# Patient Record
Sex: MALE | Race: WHITE | NOT HISPANIC OR LATINO | Employment: UNEMPLOYED | URBAN - METROPOLITAN AREA
[De-identification: names, ages, dates, MRNs, and addresses within clinical notes are randomized per-mention and may not be internally consistent; named-entity substitution may affect disease eponyms.]

---

## 2021-07-16 ENCOUNTER — HOSPITAL ENCOUNTER (EMERGENCY)
Facility: HOSPITAL | Age: 51
Discharge: PSYCHIATRIC OR SUBSTANCE USE FACILITY | End: 2021-07-16
Attending: EMERGENCY MEDICINE | Admitting: EMERGENCY MEDICINE

## 2021-07-16 VITALS
HEIGHT: 73 IN | WEIGHT: 198.85 LBS | OXYGEN SATURATION: 96 % | SYSTOLIC BLOOD PRESSURE: 95 MMHG | HEART RATE: 85 BPM | DIASTOLIC BLOOD PRESSURE: 80 MMHG | RESPIRATION RATE: 19 BRPM | TEMPERATURE: 98.1 F | BODY MASS INDEX: 26.36 KG/M2

## 2021-07-16 DIAGNOSIS — F32.A CHRONIC DEPRESSION: Primary | ICD-10-CM

## 2021-07-16 LAB
AMPHET+METHAMPHET UR QL: NEGATIVE
BARBITURATES UR QL SCN: NEGATIVE
BENZODIAZ UR QL SCN: NEGATIVE
CANNABINOIDS SERPL QL: NEGATIVE
COCAINE UR QL: NEGATIVE
ETHANOL BLD-MCNC: <10 MG/DL (ref 0–10)
ETHANOL UR QL: <0.01 %
HOLD SPECIMEN: NORMAL
HOLD SPECIMEN: NORMAL
METHADONE UR QL SCN: NEGATIVE
OPIATES UR QL: NEGATIVE
OXYCODONE UR QL SCN: NEGATIVE
WHOLE BLOOD HOLD SPECIMEN: NORMAL

## 2021-07-16 PROCEDURE — 80307 DRUG TEST PRSMV CHEM ANLYZR: CPT

## 2021-07-16 PROCEDURE — 36415 COLL VENOUS BLD VENIPUNCTURE: CPT

## 2021-07-16 PROCEDURE — 82077 ASSAY SPEC XCP UR&BREATH IA: CPT

## 2021-07-16 PROCEDURE — 99283 EMERGENCY DEPT VISIT LOW MDM: CPT

## 2021-07-16 NOTE — SIGNIFICANT NOTE
"Pt presented to the ED from the community. Pt stated that he was \"talking to a lady at a restaurant\" and she recommended that he come here in order for him to get help with resources. Pt stated that he is from [place] and made his way to [place], because he thought \"it would be better here.\" Pt reported some mild depression related to being homeless and stated that he is also off his psychiatric medications. Pt states that he has been off of his medications for three months and cannot recall what he was taking. Today, Pt is not in any acute crisis. Pt denied any SI, intents, or plans. Pt denied any HI, intents, or plans. Pt denied any delusions and reported very vague hallucinations. Pt stated that he is seeing \"flashes.\" Pt denied any command or auditory hallucinations.   Pt is interested in transferring to a homeless shelter. The closest shelter option would be [place], [place]. Pt will be provided with cab voucher to [place], [place].   Pt is recommended to follow up with an outpatient provider and appointment with be arranged with [facility] in [place], [place].        07/16/21 1313   Behavior WDL   Behavior WDL WDL   Interactions appropriate to situation   Motor Movement lethargic   Emotion Mood WDL   Emotion/Mood depressed  (Pt reports some depression related to his current situation.)   Patient rated depression level 5   Perceptual State WDL   Hallucinations   (PT stated that he sees \"flashes.\" PT denied any command or auditory hallucinations.)   Thought Process WDL   Thought Process WDL all   Delusions no delusions  (Pt is AOX4. No delusions present.)   Judgment and Insight denies responsibility;insight not appropriate to situation  (Pt provides very minimal insight into his current situation. Pt states that he is homeless and does not know why he came to [place] or to the hospital today.)   Coping/Stress   Sources of Support other (see comments)  (Pt reported that he has a very poor support system.)   Techniques to [place] " "with Loss/Stress/Change   (Pt could benefit from outpatient treatment.)   Coping/Stress Comments \"[facility] [place] about [facility] months ago. I haven't had my medications.\"   C-SSRS (Recent)   Q1 Wished to be Dead (Past Month) no   Q2 Suicidal Thoughts (Past Month) no   Q6 Suicide Behavior (Lifetime) no   Level of Risk per Screen screen negative   Violence Risk   Feels Like Hurting Others no   Previous Attempt to Harm Others no   Violence Threats in Past 6 Months Pt stated that if people bother him he feels like he wants to hurt them. Pt denied any HI and denied any specific victims that he wants to harm.     "

## 2021-07-16 NOTE — ED PROVIDER NOTES
"Subjective   Pt states he was in [place] at a psych/homeless facility. He left there and walked/hitch hiked to this area and has not had his meds in over a week. He says he was talking with someone who helps those who are homeless get their medical cards, etc and told her about his increasing irritability and agitation due to having been off his meds.  He says she told him he needed to come here, so she called the police who brought him to the. He denies SI or HI, but says he feels \"edgy\"      History provided by:  Patient   used: No    Psychiatric Evaluation  Severity:  Mild  Onset quality:  Unable to specify  Progression:  Worsening  Chronicity:  Chronic      Review of Systems   Constitutional: Negative.    HENT: Negative.    Eyes: Negative.    Respiratory: Negative.    Cardiovascular: Negative.    Gastrointestinal: Negative.    Endocrine: Negative.    Genitourinary: Negative.    Musculoskeletal: Negative.    Skin: Negative.    Allergic/Immunologic: Negative.    Neurological: Negative.    Hematological: Negative.    Psychiatric/Behavioral: Negative.        Past Medical History:   Diagnosis Date   • Hypertension    • Schizoaffective disorder (CMS/HCC)        No Known Allergies    History reviewed. No pertinent surgical history.    History reviewed. No pertinent family history.    Social History     Socioeconomic History   • Marital status:      Spouse name: Not on file   • Number of children: Not on file   • Years of education: Not on file   • Highest education level: Not on file   Tobacco Use   • Smoking status: Current Every Day Smoker     Packs/day: 1.50     Years: 30.00     Pack years: 45.00   Vaping Use   • Vaping Use: Some days   Substance and Sexual Activity   • Alcohol use: Never   • Sexual activity: Not Currently           Objective   Physical Exam  Constitutional:       Appearance: Normal appearance.   HENT:      Head: Normocephalic and atraumatic.      Nose: Nose normal. "      Mouth/Throat:      Mouth: Mucous membranes are moist.   Eyes:      Pupils: Pupils are equal, round, and reactive to light.   Cardiovascular:      Rate and Rhythm: Normal rate and regular rhythm.      Pulses: Normal pulses.   Pulmonary:      Effort: Pulmonary effort is normal.      Breath sounds: Normal breath sounds.   Abdominal:      General: Abdomen is flat. Bowel sounds are normal.      Palpations: Abdomen is soft.   Musculoskeletal:         General: Normal range of motion.      Cervical back: Normal range of motion.   Skin:     General: Skin is warm and dry.      Capillary Refill: Capillary refill takes less than 2 seconds.   Neurological:      General: No focal deficit present.      Mental Status: He is alert and oriented to person, place, and time. Mental status is at baseline.   Psychiatric:         Mood and Affect: Mood is anxious.         Behavior: Behavior is agitated.         Thought Content: Thought content does not include homicidal or suicidal ideation.         Judgment: Judgment is impulsive.         Procedures           ED Course  ED Course as of Jul 16 1427 Fri Jul 16, 2021   1314 ED  (Margaret) in to assess pt    [TP]      ED Course User Index  [TP] Chloe Rey APRN                                           MDM  Number of Diagnoses or Management Options  Chronic depression: minor  Diagnosis management comments: I have spoken with the pt and I have explained his condition, diagnoses and treatment plan based on the information available to me at this time. I have answered his questions and addressed any concerns. The patient has a good understanding of his diagnosis, condition, and treatment plan as can be expected at this point. The vital signs have been stable. The patient´s condition is stable and appropriate for discharge from the emergency department. The ED  has arranged for the patient to go to a homeless shelter, and has set up follow up for him at  [facility] in [place].     The patient will pursue further outpatient evaluation with the primary care physician or other designated or consulting physician as outlined in the discharge instructions. They are agreeable to this plan of care and follow-up instructions have been explained in detail. The pt has received these instructions in written format and have expressed an understanding of the discharge instructions. The patient is aware that any significant change in condition or worsening of symptoms should prompt an immediate return to this or the closest emergency department or call to 911.       Amount and/or Complexity of Data Reviewed  Clinical lab tests: reviewed and ordered    Risk of Complications, Morbidity, and/or Mortality  Presenting problems: low  Diagnostic procedures: low  Management options: low    Patient Progress  Patient progress: stable      Final diagnoses:   Chronic depression       ED Disposition  ED Disposition     ED Disposition Condition Comment    Discharge Stable           [facility]  [address]  [facility] [address]  [phone]  Follow up on 8/10/2021  Please follow up with [facility] at the appointment listed above.            Medication List      No changes were made to your prescriptions during this visit.          Chloe Rey, APRN  07/16/21 1427

## 2024-04-02 ENCOUNTER — HOSPITAL ENCOUNTER (INPATIENT)
Facility: HOSPITAL | Age: 54
LOS: 9 days | Discharge: HOME | End: 2024-04-11
Attending: PSYCHIATRY & NEUROLOGY | Admitting: PSYCHIATRY & NEUROLOGY
Payer: MEDICARE

## 2024-04-02 PROBLEM — R45.851 SUICIDAL IDEATIONS: Status: ACTIVE | Noted: 2024-04-02

## 2024-04-02 PROBLEM — F25.0 SCHIZOAFFECTIVE DISORDER, BIPOLAR TYPE: Status: ACTIVE | Noted: 2024-04-02

## 2024-04-02 LAB
ALBUMIN SERPL-MCNC: 3.8 G/DL (ref 3.5–5.2)
ALBUMIN/GLOB SERPL: 1.5 G/DL
ALP SERPL-CCNC: 65 U/L (ref 39–117)
ALT SERPL W P-5'-P-CCNC: 21 U/L (ref 1–41)
ANION GAP SERPL CALCULATED.3IONS-SCNC: 7.8 MMOL/L (ref 5–15)
AST SERPL-CCNC: 21 U/L (ref 1–40)
BASOPHILS # BLD AUTO: 0.03 10*3/MM3 (ref 0–0.2)
BASOPHILS NFR BLD AUTO: 0.5 % (ref 0–1.5)
BILIRUB SERPL-MCNC: 0.2 MG/DL (ref 0–1.2)
BUN SERPL-MCNC: 13 MG/DL (ref 6–20)
BUN/CREAT SERPL: 16.7 (ref 7–25)
CALCIUM SPEC-SCNC: 8.9 MG/DL (ref 8.6–10.5)
CHLORIDE SERPL-SCNC: 108 MMOL/L (ref 98–107)
CO2 SERPL-SCNC: 26.2 MMOL/L (ref 22–29)
CREAT SERPL-MCNC: 0.78 MG/DL (ref 0.76–1.27)
DEPRECATED RDW RBC AUTO: 44.5 FL (ref 37–54)
EGFRCR SERPLBLD CKD-EPI 2021: 105.3 ML/MIN/1.73
EOSINOPHIL # BLD AUTO: 0.17 10*3/MM3 (ref 0–0.4)
EOSINOPHIL NFR BLD AUTO: 2.9 % (ref 0.3–6.2)
ERYTHROCYTE [DISTWIDTH] IN BLOOD BY AUTOMATED COUNT: 12.7 % (ref 12.3–15.4)
GLOBULIN UR ELPH-MCNC: 2.6 GM/DL
GLUCOSE SERPL-MCNC: 94 MG/DL (ref 65–99)
HCT VFR BLD AUTO: 41.3 % (ref 37.5–51)
HGB BLD-MCNC: 14.1 G/DL (ref 13–17.7)
IMM GRANULOCYTES # BLD AUTO: 0.02 10*3/MM3 (ref 0–0.05)
IMM GRANULOCYTES NFR BLD AUTO: 0.3 % (ref 0–0.5)
LYMPHOCYTES # BLD AUTO: 2.17 10*3/MM3 (ref 0.7–3.1)
LYMPHOCYTES NFR BLD AUTO: 36.7 % (ref 19.6–45.3)
MCH RBC QN AUTO: 32.9 PG (ref 26.6–33)
MCHC RBC AUTO-ENTMCNC: 34.1 G/DL (ref 31.5–35.7)
MCV RBC AUTO: 96.5 FL (ref 79–97)
MONOCYTES # BLD AUTO: 0.77 10*3/MM3 (ref 0.1–0.9)
MONOCYTES NFR BLD AUTO: 13 % (ref 5–12)
NEUTROPHILS NFR BLD AUTO: 2.75 10*3/MM3 (ref 1.7–7)
NEUTROPHILS NFR BLD AUTO: 46.6 % (ref 42.7–76)
NRBC BLD AUTO-RTO: 0 /100 WBC (ref 0–0.2)
PLATELET # BLD AUTO: 186 10*3/MM3 (ref 140–450)
PMV BLD AUTO: 9.2 FL (ref 6–12)
POTASSIUM SERPL-SCNC: 4.1 MMOL/L (ref 3.5–5.2)
PROT SERPL-MCNC: 6.4 G/DL (ref 6–8.5)
RBC # BLD AUTO: 4.28 10*6/MM3 (ref 4.14–5.8)
SODIUM SERPL-SCNC: 142 MMOL/L (ref 136–145)
TSH SERPL DL<=0.05 MIU/L-ACNC: 0.6 UIU/ML (ref 0.27–4.2)
WBC NRBC COR # BLD AUTO: 5.91 10*3/MM3 (ref 3.4–10.8)

## 2024-04-02 PROCEDURE — 80053 COMPREHEN METABOLIC PANEL: CPT | Performed by: PSYCHIATRY & NEUROLOGY

## 2024-04-02 PROCEDURE — 84443 ASSAY THYROID STIM HORMONE: CPT | Performed by: PSYCHIATRY & NEUROLOGY

## 2024-04-02 PROCEDURE — 85025 COMPLETE CBC W/AUTO DIFF WBC: CPT | Performed by: PSYCHIATRY & NEUROLOGY

## 2024-04-02 PROCEDURE — 93005 ELECTROCARDIOGRAM TRACING: CPT | Performed by: PSYCHIATRY & NEUROLOGY

## 2024-04-02 RX ORDER — TRAZODONE HYDROCHLORIDE 50 MG/1
50 TABLET ORAL NIGHTLY PRN
Status: DISCONTINUED | OUTPATIENT
Start: 2024-04-02 | End: 2024-04-11 | Stop reason: HOSPADM

## 2024-04-02 RX ORDER — HYDROXYZINE HYDROCHLORIDE 25 MG/1
50 TABLET, FILM COATED ORAL EVERY 6 HOURS PRN
Status: DISCONTINUED | OUTPATIENT
Start: 2024-04-02 | End: 2024-04-11 | Stop reason: HOSPADM

## 2024-04-02 RX ORDER — ACETAMINOPHEN 325 MG/1
650 TABLET ORAL EVERY 4 HOURS PRN
Status: DISCONTINUED | OUTPATIENT
Start: 2024-04-02 | End: 2024-04-11 | Stop reason: HOSPADM

## 2024-04-02 RX ORDER — NICOTINE 21 MG/24HR
1 PATCH, TRANSDERMAL 24 HOURS TRANSDERMAL EVERY 24 HOURS
Status: DISCONTINUED | OUTPATIENT
Start: 2024-04-02 | End: 2024-04-04

## 2024-04-02 RX ORDER — NICOTINE 21 MG/24HR
1 PATCH, TRANSDERMAL 24 HOURS TRANSDERMAL EVERY 24 HOURS
Status: DISCONTINUED | OUTPATIENT
Start: 2024-04-02 | End: 2024-04-02

## 2024-04-02 RX ORDER — ARIPIPRAZOLE 5 MG/1
5 TABLET ORAL DAILY
Status: DISCONTINUED | OUTPATIENT
Start: 2024-04-02 | End: 2024-04-04

## 2024-04-02 RX ORDER — ALUMINA, MAGNESIA, AND SIMETHICONE 2400; 2400; 240 MG/30ML; MG/30ML; MG/30ML
15 SUSPENSION ORAL EVERY 6 HOURS PRN
Status: DISCONTINUED | OUTPATIENT
Start: 2024-04-02 | End: 2024-04-11 | Stop reason: HOSPADM

## 2024-04-02 RX ORDER — LOPERAMIDE HYDROCHLORIDE 2 MG/1
2 CAPSULE ORAL
Status: DISCONTINUED | OUTPATIENT
Start: 2024-04-02 | End: 2024-04-11 | Stop reason: HOSPADM

## 2024-04-02 RX ADMIN — TRAZODONE HYDROCHLORIDE 50 MG: 50 TABLET ORAL at 20:08

## 2024-04-02 RX ADMIN — ALUMINUM HYDROXIDE, MAGNESIUM HYDROXIDE, AND DIMETHICONE 15 ML: 400; 400; 40 SUSPENSION ORAL at 20:09

## 2024-04-02 RX ADMIN — ARIPIPRAZOLE 5 MG: 5 TABLET ORAL at 14:59

## 2024-04-02 RX ADMIN — ACETAMINOPHEN 650 MG: 325 TABLET, FILM COATED ORAL at 20:08

## 2024-04-02 RX ADMIN — HYDROXYZINE HYDROCHLORIDE 50 MG: 25 TABLET ORAL at 20:08

## 2024-04-02 RX ADMIN — HYDROXYZINE HYDROCHLORIDE 50 MG: 25 TABLET ORAL at 02:14

## 2024-04-02 RX ADMIN — LOPERAMIDE HYDROCHLORIDE 2 MG: 2 CAPSULE ORAL at 02:14

## 2024-04-02 RX ADMIN — TRAZODONE HYDROCHLORIDE 50 MG: 50 TABLET ORAL at 02:14

## 2024-04-02 NOTE — SIGNIFICANT NOTE
04/02/24 1646   Group Therapy Session   Group Attendance attended group session   Time Session Began 1400   Time Session Ended 1500   Total Time (minutes) 60   Group Type psychotherapeutic   Group Topic Covered balanced lifestyle;coping skills/lifestyle management;other (see comments)  (triggers)   Literature/Videos Given topic handouts   Group Session Detail Identify and increase awareness of personal triggers and their roots. Develop and implement effective coping skills to address trigger.   Patient Participation/Contribution refused to participate   Patient Participation Detail patient slept through group

## 2024-04-02 NOTE — H&P
INITIAL PSYCHIATRIC HISTORY & PHYSICAL    Patient Identification:  Name:  Juani Johnson  Age:  53 y.o.  Sex:  male  :  1970  MRN:  [id number]   Visit Number:  [id number]  Primary Care Physician:  Provider, No Known    This provider is located at her home address in [place]. on file with [facility]. This visit is being done on behalf of [facility] using a secure platform for a video visit in a secure and private environment. The Patient is located at [facility]-on a locked Behavioral Health unit. The patient's condition being diagnosed/treated is appropriate for telemedicine. The provider identified herself as well as her credentials. The patient, and/or patient's guardian, consent to being seen remotely, and when consent is given they understand that the consent allows for patient identifiable information to be sent to a third party as needed. They may refuse to be seen remotely at any time. The electronic data is encrypted and password protected, and the patient and/or guardian has been advised of the potential risks to privacy not withstanding such measures.        CC/Focus of Exam: Auditory hallucinations with plan to jump off a bridge    HPI: Juani Johnson is a 53 y.o. male who was admitted on 2024 with complaints of sedations and suicidal ideation with a plan to jump off a bridge.  Patient was a direct admit from [facility], where he self presented with the above complaints.     Patient has a long psychiatric history per his report.  He has never been to our inpatient facility however.  So information is limited, despite the history.  Patient is a poor historian.    He tells me that he has a previous diagnosis of bipolar and schizophrenia.  He has had previous hospitalizations including 2 admissions to [facility].  He is unable to tell me details, but thinks perhaps it was 2 months ago..    He reports  to me that he only takes medications when he is hospitalized, and that he struggles with suicidal ideations auditory and visual hallucinations.  He has a past history of suicide attempts last Halloween 2023 where he tried to choke himself.    Juani is homeless living on the streets.  At times he is quite disorganized and quite difficult to understand.      Past medications he reports he is taken Abilify as well as an antidepressant medication as well as a stomach pill.    He talks about the difficulties in getting his disability money, as he needs an identification which he does not currently have.    He endorses feelings of depression, suicidal ideation with plan to jump off a bridge, he reports having auditory hallucinations, as well as visual hallucinations which she describes as a blur color.  The symptoms have worsened since he has been off his psychiatric medications.  Presumably since being discharged from [facility], which thinks was about 2 months ago.      PAST PSYCHIATRIC HX:   Patient reports previous inpatient hospitalizations at least 4 or 5 times most recently [facility] 2 months ago.  He reports a diagnosis of schizophrenia and bipolar.  He is currently suicidal with a plan to jump off of a bridge.  Most recent suicide attempt and the only 1 that he discloses to me today, was by choking himself following up last year 2023.    Previous psych medications include Abilify as well as an unknown antidepressant.        SUBSTANCE USE HX:  He denies a history of illegal drug use and alcohol use.      SOCIAL HX:  He reports growing up he lived in [place] as well as [place].  He apparently has been in [place] for only a few months.  He reports he has 3 children who he does not have contact with he is  about 6 years, although details are difficult to ascertain.  It sounds as though there was a significant other relationship 20+ years ago.  Education he completed  "the 12th grade he has worked in many labor jobs throughout the years.    Past Medical History:   Diagnosis Date    Hypertension     Schizoaffective disorder         History reviewed. No pertinent surgical history.    History reviewed. No pertinent family history.      No medications prior to admission.         ALLERGIES:  Patient has no known allergies.    Temp:  [98.1 °F (36.7 °C)] 98.1 °F (36.7 °C)  Heart Rate:  [74-78] 74  Resp:  [16-18] 16  BP: (132-137)/(84-87) 137/87    REVIEW OF SYSTEMS:    General ROS: negative for - fever or malaise  Respiratory ROS: no cough, shortness of breath, or wheezing  Cardiovascular ROS: no chest pain   Gastrointestinal ROS: no abdominal pain, nausea, vomiting or diarrhea  Neuro: negative for seizures  Musculoskeletal: No difficulty moving extremities, or numbness or tingling.  Skin: negative for rash  He does report a history of \"stomach problems\" but is not able to articulate this further, he does report a history of numbness in his hands and feet as well as pain, he also describes history of injury to his upper back and neck, he mentions cracked vertebrae , from work history .  This discomfort he states is exacerbated by homelessness, he describes it as feeling pressure on his shoulders . he has a history of high blood pressure but states he is not currently medicated for this.  Previous diagnosis of hepatitis C as well    PHYSICAL EXAM:.  Physical Exam    This physical exam has been performed remotely in the unit aided of audio/visual communication tools. Nursing staff present and assisted as needed to complete.     Physical Exam:  General: Patient appears awake, alert, and in no acute distress.  Head: Normocephalic, atraumatic  Eyes: EOMI. Conjunctivae and sclerae normal.  Ears: Ears appear intact with no abnormalities noted.   Neck: Trachea midline. No obvious JVD or Thyroid enlargement.   Heart: Vital signs and EKG reviewed   Lungs: Respirations appear to be regular, even " "and unlabored with no signs of respiratory distress. No audible wheezing.  Abdomen: No obvious abdominal distension.  MS: Muscle tone appears normal. No gross deformities.  Extremities: No cyanosis or edema noted.  Skin: No visible bleeding, bruising, or rash.  Neurologic:  AAOx4. No involuntary movements observed. Coordination grossly intact.  Gait stable and steady. Moves all extremities without difficulty. Able to follow complex commands.   CN I:    Sense of smell grossly intact  CN II:               Pupils are equal and round. No gross deficits in visual acuity.  CN III IV VI:     Extraocular movements are grossly intact.  CN V:              Facial sensation and strength of muscles of mastication grossly intact.  CN VII:            Facial movements are grossly symmetric. No overt weakness.   CN VIII:           Hearing is grossly intact  CN IX and X:  Grossly intact  CN XI:             SCM and trapezius grossly normal  CN XII:            Grossly intact, tongue midline      MENTAL STATUS EXAM:    Appearance: Casually dressed, disheveled  Behavior: Generally cooperative, fair eye contact  Psychomotor: No psychomotor agitation noted, No EPS, No motor tics noted  Speech: Rambling rhythm and regular tone  Mood: \"Depressed\"   Affect: congruent and appropriate to topic at hand  Thought Content: Rambling tangential at times  Thought process: Disorganized at times  Suicidality: Endorses suicidal ideations currently  Homicidality: No HI  Perception: Reports both AH/  Insight: limited  Judgment: limited      Imaging Results (Last 24 Hours)       ** No results found for the last 24 hours. **             ECG/EMG Results (most recent)       None             Lab Results   Component Value Date    GLUCOSE 94 04/02/2024    BUN 13 04/02/2024    CREATININE 0.78 04/02/2024    EGFRIFNONA 106 01/16/2023    EGFRIFAFRI 123 01/16/2023    BCR 16.7 04/02/2024    CO2 26.2 04/02/2024    CALCIUM 8.9 04/02/2024    ALBUMIN 3.8 04/02/2024    " AST 21 04/02/2024    ALT 21 04/02/2024       Lab Results   Component Value Date    WBC 5.91 04/02/2024    HGB 14.1 04/02/2024    HCT 41.3 04/02/2024    MCV 96.5 04/02/2024     04/02/2024       Last Urine Toxicity  More data exists         Latest Ref Rng & Units 3/4/2023 3/2/2023   LAST URINE TOXICITY RESULTS   Barbiturates Screen, Urine <200 ng/ml Negative     None Detected       Benzodiazepine Screen, Urine <200 ng/ml Negative     None Detected       Cocaine Screen, Urine <300 ng/ml Negative     None Detected          Details          This result is from an external source.               Brief Urine Lab Results       None                ASSESSMENT & PLAN:        Suicidal ideations    Schizoaffective disorder, bipolar type      -Admit to [facility] for safety and stabilization   -Acclimate to unit and daily schedule   -Patient to attend group therapies as well as participate in individual therapy sessions throughout time on the unit.  -Continue precautions and safety checks  -Order comfort PRN medications.  -Education on risks of smoking tobacco products to be complete during stay, and replacement options made available.  -Physical examinations to be complete and admission labs being resulted and to be reviewed  uds is neg.  -A treatment plan will be developed and agreed upon by the patient and treatment team.  -Medication reconciliation to be completed by Pharmacist, Nurse and Psychiatrist. MISAEL to be reviewed if indicated and risk versus benefits as well as alternatives to medication regimens discussed with the patient. Will monitor for side effects during inpatient stay.   -Medications: Patient would like to be started back up on the Abilify.  Will start this for both mood and psychosis.  Will monitor patient's blood pressure, and address if needed.    -Treatment team to discuss case regularly at start discharge planning early on to aid in safe transition to a lower level of care when most  appropriate for patient.   -Estimated length of stay at this inpatient level of care is 5 to 7 days.       Psychiatrist:  Chelita Mcgowan MD  04/02/24  14:45 EDT

## 2024-04-02 NOTE — SIGNIFICANT NOTE
04/02/24 1214   Group Therapy Session   Group Attendance refused to attend group session   Time Session Began 1130   Time Session Ended 1200   Total Time (minutes) 30   Group Type recreation   Group Topic Covered other (see comments)  (Leisure education)   Group Session Detail Patient participated in group promoting identification of potential leisure activities to explore

## 2024-04-02 NOTE — PLAN OF CARE
Problem: Adult Behavioral Health Plan of Care  Goal: Plan of Care Review  Outcome: Ongoing, Progressing  Flowsheets  Taken 4/2/2024 1624 by [facility]  Patient Agreement with Plan of Care: agrees  Outcome Evaluation: New admit. Completed social history and initial eval  Taken 4/2/2024 0900 by Alyssa Hill RN  Plan of Care Reviewed With: patient  Goal: Patient-Specific Goal (Individualization)  Outcome: Ongoing, Progressing  Flowsheets  Taken 4/2/2024 1624 by Cynthia [facility]cedo [facility]  Patient-Specific Goals (Include Timeframe): Patient will deny SI/HI/AVH by discharge. Patient will establish individual coping skills and a safety plan for a transition of care. Patient will establish outpatient services for ongoing support post discharge.  Individualized Care Needs: Therapist to offer 1-4 therapy sessions, aftercare, planning, safety planning, family education, group therapy, and brief CBT/MI interventions.  Taken 4/2/2024 1517 by Cynthia Saucedo [facility]  Patient Personal Strengths:   expressive of needs   resilient   resourceful  Patient Vulnerabilities:   housing insecurity   lacks insight into illness   limited support system   poor physical health  Taken 4/2/2024 0149 by Malika Lee RN  Anxieties, Fears or Concerns: not having a place to live  Goal: Optimized Coping Skills in Response to Life Stressors  Outcome: Ongoing, Progressing  Intervention: Promote Effective Coping Strategies  Flowsheets (Taken 4/2/2024 1624)  Supportive Measures:   active listening utilized   goal-setting facilitated   decision-making supported   positive reinforcement provided   relaxation techniques promoted   self-care encouraged   self-reflection promoted   verbalization of feelings encouraged  Goal: Develops/Participates in Therapeutic [place] to Support Successful Transition  Outcome: Ongoing, Progressing  Intervention: Foster Therapeutic [place]  Flowsheets (Taken 4/2/2024 1624)  Trust  Relationship/Rapport:   care explained   choices provided   emotional support provided   empathic listening provided   questions answered   questions encouraged   thoughts/feelings acknowledged   reassurance provided  Intervention: Mutually Develop Transition Plan  Flowsheets (Taken 4/2/2024 4062)  Outpatient/Agency/Support Group Needs:   outpatient counseling   outpatient psychiatric care (specify)   outpatient medication management  Transition Support:   community resources reviewed   crisis management plan promoted   crisis management plan verbalized   follow-up care discussed  Transportation Anticipated: agency  Anticipated Discharge Disposition: homeless shelter  Transportation Concerns: no car  Current Discharge Risk:   lack of support system/caregiver   psychiatric illness  Concerns to be Addressed:   care coordination/care conferences   discharge planning   basic needs   coping/stress   financial/insurance   medication   mental health   suicidal  Readmission Within the Last 30 Days: no previous admission in last 30 days  Patient/Family Anticipated Services at Transition:      mental health services   outpatient care  Patient's Choice of Community Agency(s): [place]  Patient/Family Anticipates Transition to: shelter   Goal Outcome Evaluation:     Patient Agreement with Plan of Care: agrees        Outcome Evaluation: New admit. Completed social history and initial eval

## 2024-04-02 NOTE — PLAN OF CARE
Goal Outcome Evaluation:  Plan of Care Reviewed With: patient  Patient Agreement with Plan of Care: agrees  Consent Given to Review Plan with: Pleasant this shift, continues to endorse SI with plan to jump off a bridge, continues with AH, hearing command voices to harm himself,denies HI and VH, generalized edema noted, conitinue with POC
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
## 2024-04-02 NOTE — SIGNIFICANT NOTE
04/02/24 1638   Group Therapy Session   Group Attendance excused from group session   Time Session Began 1030   Time Session Ended 1100   Total Time (minutes) 30   Group Type psychotherapeutic   Group Topic Covered other (see comments)  (values)   Literature/Videos Given topic handouts   Literature/Videos Given Comments Processed how values provide a sense of purpose and help live a more meaningful life. Utilized art activity to identify core values, previous values, and values that are becoming more important.   Patient Participation Detail Excused from group due to being early morning admission, patient resting in room

## 2024-04-02 NOTE — PLAN OF CARE
"Goal Outcome Evaluation:  Plan of Care Reviewed With: patient  Patient Agreement with Plan of Care: agrees      New admit and oriented to unit. Transfer from [facility] in [place], [place]. VSS. Pt cooperative to assessment. Pt reports anxiety of a 9/10 and depression of a 10/10. Pt endorses SI with plan to \"jump from train off a bridge\". Pt denies HI. Pt reports commanding voices telling him to \"kill himself\" or \"hurt himself\". Pt required PRN atarax, trazodone and imodium. Will continue plan of care.                                   "

## 2024-04-02 NOTE — SIGNIFICANT NOTE
04/02/24 1121   Pertinent Diagnosis/Presenting Problems   Presenting Problems/Reason for Referral Suicidal ideation   Therapeutic Recreation Participation   Recreation Therapy Summary of Participation Patient asleep and would not wake for assessment. Will retry at a later time if patient agreeable.

## 2024-04-03 PROCEDURE — 93010 ELECTROCARDIOGRAM REPORT: CPT | Performed by: INTERNAL MEDICINE

## 2024-04-03 RX ORDER — IBUPROFEN 600 MG/1
600 TABLET ORAL EVERY 6 HOURS PRN
Status: DISCONTINUED | OUTPATIENT
Start: 2024-04-03 | End: 2024-04-11 | Stop reason: HOSPADM

## 2024-04-03 RX ORDER — PANTOPRAZOLE SODIUM 40 MG/1
40 TABLET, DELAYED RELEASE ORAL
Status: DISCONTINUED | OUTPATIENT
Start: 2024-04-03 | End: 2024-04-11 | Stop reason: HOSPADM

## 2024-04-03 RX ADMIN — DICLOFENAC SODIUM 4 G: 10 GEL TOPICAL at 20:07

## 2024-04-03 RX ADMIN — TRAZODONE HYDROCHLORIDE 50 MG: 50 TABLET ORAL at 20:08

## 2024-04-03 RX ADMIN — ARIPIPRAZOLE 5 MG: 5 TABLET ORAL at 09:07

## 2024-04-03 RX ADMIN — SERTRALINE HYDROCHLORIDE 25 MG: 50 TABLET ORAL at 15:19

## 2024-04-03 RX ADMIN — HYDROXYZINE HYDROCHLORIDE 50 MG: 25 TABLET ORAL at 20:08

## 2024-04-03 RX ADMIN — PANTOPRAZOLE SODIUM 40 MG: 40 TABLET, DELAYED RELEASE ORAL at 15:19

## 2024-04-03 RX ADMIN — MAGNESIUM HYDROXIDE 10 ML: 2400 SUSPENSION ORAL at 09:11

## 2024-04-03 RX ADMIN — IBUPROFEN 600 MG: 600 TABLET, FILM COATED ORAL at 20:08

## 2024-04-03 RX ADMIN — DICLOFENAC SODIUM 4 G: 10 GEL TOPICAL at 12:46

## 2024-04-03 RX ADMIN — ACETAMINOPHEN 650 MG: 325 TABLET, FILM COATED ORAL at 09:07

## 2024-04-03 RX ADMIN — DICLOFENAC SODIUM 4 G: 10 GEL TOPICAL at 15:19

## 2024-04-03 NOTE — SIGNIFICANT NOTE
04/03/24 1244   Group Therapy Session   Group Attendance attended group session   Time Session Began 1130   Time Session Ended 1200   Total Time (minutes) 30   Group Type recreation   Group Topic Covered leisure exploration/use of leisure time   Group Session Detail Patient participated in group activity to promote increase of knowledge in different leisure activities   Patient Participation/Contribution able to recall/repeat info presented;arrived late;cooperative with task;discussed personal experience with topic;expressed understanding of topic;offered helpful suggestions to peers;listened actively   Patient Participation Detail Patient pleasant and actively engaged with present. Patient arrived late to group from meeting with providers. Some of patients verbalizations were off topic but when asked for clarification could connect them back to original topic.   Affect During Group affect consistent with mood;appropriate to situation   Degree of Insight high

## 2024-04-03 NOTE — PLAN OF CARE
Goal Outcome Evaluation:  Plan of Care Reviewed With: patient  Patient Agreement with Plan of Care: agrees     Progress: improving  Outcome Evaluation: Pt continues to report SI with no plan and auditory hallucinations.  Pt noted throughout shift to appear to be responding to internal stimuli.  Pt reports anxiety 5/10 and depression 8/10.  Pt given Tylenol for c/o pain.  MOM for c/o constipation.  Pt calm and cooperative this shift.

## 2024-04-03 NOTE — THERAPY TREATMENT NOTE
"DATA:    Therapist met with the patient individually. Therapist continues reviewing plan of care and aftercare plan.  The patient was agreeable.    ASSESSMENT:    Patient was seen for follow up of SI and hallucinations.       Today, patient was seen 1-1 in office. Patient reports mood is \"miserable,\" affect congruent, thought content disorganized. Patient reports feeling miserable due to \"theres pressure on top of my head and its pushing downward.\"  Patient reports sleep is \"alright\" and appetite is \"alright.\"  On scale 1-10, patient rates depression 7 and anxiety 8. Patient endorses AH as \"hearing the words of God again.\" He also reports VH including \"the lights tuned into green grids on the floor and then brown ones on the han.\" Patient reports \"no not really, occasionally with the pressure on me\" to experiencing current SI. When exploring death wish patient states, \"every once in a while I do but that's a part of being with god, its tough but its supposed to help me in that department.\" Patient denies HI. Patient presents with clanging speech at times and will ramble nonsensically. Patients thought process appear Scientologist in nature and reports to believe homeless people are being poisoned.          CLINICAL MANEUVERING:    Therapist provided safe, secure environment for patient to share.  Provided reflective listening and psychoeducation.  Assisted patient in processing the above session. Assisted patient in identifying any questions or needs to be addressed today. Patient difficult to engage in therapeutic intervention at times due to symptom burden. Assisted patient on increasing insight and understanding.           Plan:     Patient to remain hospitalized this date.      Treatment team will focus efforts on stabilizing patient's acute symptoms while providing education on healthy coping and crisis management to reduce hospitalizations.   Patient requires daily psychiatrist evaluation and 24/7 nursing " supervision to promote patient  safety.     Therapist will offer 1-4 individual sessions, family education, and appropriate referral.     Therapist recommends continued assessment.
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
## 2024-04-03 NOTE — PLAN OF CARE
"Goal Outcome Evaluation:  Plan of Care Reviewed With: patient  Patient Agreement with Plan of Care: agrees     Progress: improving  Outcome Evaluation: Pt pleasant but quiet throughout shift. VSS. Pt reported anxiety and depression of 8/10. When asked about SI, pt responded \"a little\", and specified prior plan of jumping off bridge. When asked about AVH, pt reported hearing the \"voices of the larry of death\", but could not disclose the content of the voices. Pt required PRN atarax, trazodone, maalox and tylenol overnight. Pt slept well overnight. Will continue plan of care.                               "

## 2024-04-04 RX ORDER — ARIPIPRAZOLE 5 MG/1
10 TABLET ORAL DAILY
Status: DISCONTINUED | OUTPATIENT
Start: 2024-04-05 | End: 2024-04-06

## 2024-04-04 RX ORDER — ARIPIPRAZOLE 5 MG/1
5 TABLET ORAL DAILY
Status: COMPLETED | OUTPATIENT
Start: 2024-04-04 | End: 2024-04-04

## 2024-04-04 RX ADMIN — ARIPIPRAZOLE 5 MG: 5 TABLET ORAL at 14:11

## 2024-04-04 RX ADMIN — ACETAMINOPHEN 650 MG: 325 TABLET, FILM COATED ORAL at 20:34

## 2024-04-04 RX ADMIN — SERTRALINE HYDROCHLORIDE 25 MG: 50 TABLET ORAL at 08:33

## 2024-04-04 RX ADMIN — TRAZODONE HYDROCHLORIDE 50 MG: 50 TABLET ORAL at 20:34

## 2024-04-04 RX ADMIN — ARIPIPRAZOLE 5 MG: 5 TABLET ORAL at 08:33

## 2024-04-04 RX ADMIN — HYDROXYZINE HYDROCHLORIDE 50 MG: 25 TABLET ORAL at 20:34

## 2024-04-04 RX ADMIN — PANTOPRAZOLE SODIUM 40 MG: 40 TABLET, DELAYED RELEASE ORAL at 08:33

## 2024-04-04 NOTE — THERAPY TREATMENT NOTE
"DATA:    Therapist met with the patient individually. Therapist continues reviewing plan of care and aftercare plan.  The patient was agreeable.    ASSESSMENT:    Patient was seen for follow up of SI and hallucinations.       Today, patient was seen 1-1 in office. The patient's mood appears neutral, affect congruent, thought content disorganized and Oriental orthodox in nature. Patient will often ramble nonsensically. Patient repots feeling \"alright, tired.\" Patient reports sleep is \"off and on\" and appetite is  \"alright\" . On scale 1-10, patient rates depression 8 and anxiety 8. Patient reports \"some\" hallucinations; he reports VH of \"just different colors coming off other colors.\" Patient reports hearing \"the larry of death speaking to me. She's different than that, she's an agel of spirits. her spirit is surrendered to god. she keeps me up at night. By rubbing on them and tickeling them, its supposed to feel like worms and stuff.\" Patient reports \"occasional but not that much\" SI. He denies HI. When exploring death wish patient reports, \"the way things are down here, I wish I was in heaven with god. you dont have to worry about anything up there. Its all the goodness and stuff, and different colors. God told me about it, about what its like up there.\"       CLINICAL MANEUVERING:    Therapist provided safe, secure environment for patient to share.  Provided reflective listening and psychoeducation.  Assisted patient in processing the above session. Assisted patient in identifying any questions or needs to be addressed today. Patient difficult to engage in therapeutic intervention at times due to symptom burden. Provided supportive therapy as patient shared beliefs and hallucinations.     Plan:     Patient to remain hospitalized this date.      Treatment team will focus efforts on stabilizing patient's acute symptoms while providing education on healthy coping and crisis management to reduce hospitalizations.   Patient " requires daily psychiatrist evaluation and 24/7 nursing supervision to promote patient  safety.     Therapist will offer 1-4 individual sessions, family education, and appropriate referral.     Therapist recommends continued assessment.

## 2024-04-04 NOTE — PLAN OF CARE
Goal Outcome Evaluation:                                    Pt calm and cooperative throughout shift. Pt remains withdrawn from peers and staff at times. Pt continues to respond to internal stimuli and becomes hyperreligious at times. No PRNs given. Continue POC

## 2024-04-04 NOTE — SIGNIFICANT NOTE
04/03/24 1600   Group Therapy Session   Group Attendance attended group session   Time Session Began 1400   Time Session Ended 1450   Total Time (minutes) 50   Group Type psychotherapeutic   Group Session Detail utilized art activity to examine inner and outer identity while increasing self-awareness and self-acceptance. Explored self-esteem, values and identified misconceptions or unhelpful views.   Patient Participation/Contribution cooperative with task;disorganized;verbalizations were off topic

## 2024-04-04 NOTE — SIGNIFICANT NOTE
04/03/24 1201   Group Therapy Session   Group Attendance attended group session   Time Session Began 1030   Time Session Ended 1100   Total Time (minutes) 30   Group Type psychotherapeutic   Group Topic Covered coping skills/lifestyle management   Literature/Videos Given topic handouts   Literature/Videos Given Comments Utilized art activity to identify current self care regimen, ideas interested in trying and practices tried in the past but found unhelpful.   Patient Participation/Contribution cooperative with task

## 2024-04-04 NOTE — PLAN OF CARE
"Goal Outcome Evaluation:  Plan of Care Reviewed With: patient  Patient Agreement with Plan of Care: agrees     Progress: improving  Outcome Evaluation: Pt remains calm and cooperative. Pt describes himself as \"sorrowful\". Pt reports anxiety of 7/10 and depression of 05/10. Pt denies HI. Pt continues to report SI with plan to \"jump from train\". Pt reports auditory hallucinations of the \"voice of god, the devil and the larry of death\". Pt states that he hears \"the devil\" telling him \"eat me, eat you, eat all\". Pt noted to be responding to internal stimuli on multiple occassions. Pt required PRN motrin, atarax and trazodone overnight. Will continue plan of care.                               "

## 2024-04-05 LAB
QT INTERVAL: 400 MS
QTC INTERVAL: 434 MS

## 2024-04-05 RX ADMIN — HYDROXYZINE HYDROCHLORIDE 50 MG: 25 TABLET ORAL at 21:06

## 2024-04-05 RX ADMIN — PANTOPRAZOLE SODIUM 40 MG: 40 TABLET, DELAYED RELEASE ORAL at 08:36

## 2024-04-05 RX ADMIN — IBUPROFEN 600 MG: 600 TABLET, FILM COATED ORAL at 08:36

## 2024-04-05 RX ADMIN — SERTRALINE HYDROCHLORIDE 50 MG: 50 TABLET ORAL at 08:36

## 2024-04-05 RX ADMIN — ARIPIPRAZOLE 10 MG: 5 TABLET ORAL at 08:36

## 2024-04-05 RX ADMIN — TRAZODONE HYDROCHLORIDE 50 MG: 50 TABLET ORAL at 21:06

## 2024-04-05 RX ADMIN — MAGNESIUM HYDROXIDE 10 ML: 2400 SUSPENSION ORAL at 08:36

## 2024-04-05 NOTE — THERAPY TREATMENT NOTE
"DATA:    Therapist met with the patient individually. Therapist continues reviewing plan of care and aftercare plan.  The patient was agreeable.    ASSESSMENT:    Patient was seen for follow up of SI and hallucinations.       Today, patient was seen 1-1 in office. The patient's mood appears neutral, affect congruent, thought content disorganized and tangential at times. Patient reports sleep is \"partial\" and appetite is Fair. On scale 1-10, patient rates depression 8 and anxiety \"our anxiety is high in heaven so I imagine the boys is high too, I'd say its a 7.\" Patient reports AVH including \"I was seeing the wall turn pink earlier. its just the color thing when I look at the light. The larry of death speaks to my ear. I hear god talking to me occasionally, he said be good kid because time is short or time is gort whatever you prefer. God is talking through me, he does that all the time.\" Patient reports \"not really\" having SI/HI. When exploring death wish patient states, \"I was I was eternal like God.\" Patient reports \"my thoughts are getting more scattered, I can't get restarted because my thoughts are, I can't do it on my own. Plus the weather and the people that separate me. I don't know if I'm coming or going or [facility].\"       CLINICAL MANEUVERING:    Therapist provided safe, secure environment for patient to share.  Provided reflective listening and psychoeducation.  Assisted patient in processing the above session. Assisted patient in identifying any questions or needs to be addressed today. Provided supportive therapy and encouraged patient to express feelings related to mental illness and engagement in recovery.           Plan:     Patient to remain hospitalized this date.      Treatment team will focus efforts on stabilizing patient's acute symptoms while providing education on healthy coping and crisis management to reduce hospitalizations.   Patient requires daily psychiatrist evaluation and 24/7 " nursing supervision to promote patient  safety.     Therapist will offer 1-4 individual sessions, family education, and appropriate referral.     Therapist recommends continued assessment.

## 2024-04-05 NOTE — THERAPY DISCHARGE NOTE
PT order was received.  PT spoke with patient and he states he does not require therapy services at this time.  PT is available if patient's mobility status changes.  PT will sign off at this time.

## 2024-04-05 NOTE — PLAN OF CARE
"Goal Outcome Evaluation:  Plan of Care Reviewed With: patient  Patient Agreement with Plan of Care: agrees     Progress: no change  Outcome Evaluation: no acute events during shift at this time. VSS. pt denies HI. pt reports SI \" sometimes\". pt reports AH of voices & VH of lights/colors. pt rambling. pt reports anxiety 7/10 & depression8/10. PRN tylenol, atarax, & trazodone given. no other changes in pt condition at this time. continue plan of care.                               "

## 2024-04-05 NOTE — PROGRESS NOTES
INPATIENT PSYCHIATRIC PROGRESS NOTE    Name:  Juani Johnson  :  1970  MRN:  [id number]  Visit Number:  [id number]  Length of stay:  3    This provider is located at her home address in [place] on file with [facility]. This visit is being done on behalf of [facility] using a secure platform for a video visit in a secure and private environment. The Patient is located at [facility]-on a locked Behavioral Health unit. The patient's condition being diagnosed/treated is appropriate for telemedicine. The provider identified herself as well as her credentials. The patient, and/or patient's guardian, consent to being seen remotely, and when consent is given they understand that the consent allows for patient identifiable information to be sent to a third party as needed. They may refuse to be seen remotely at any time. The electronic data is encrypted and password protected, and the patient and/or guardian has been advised of the potential risks to privacy not withstanding such measures.    SUBJECTIVE    CC/Focus of Exam: Inpatient follow up    INTERVAL HISTORY:   Patient seen chart reviewed and case discussed in treatment team who report no major behavioral problems overnight.  Report patient still showing signs of psychosis, but thoughts are starting to become less disorganized.  Less rhyming in general.  patient attending groups and generally appropriate with peers and staff on the unit.    PRNs overnight given: Trazodone and hydroxyzine    On interview today patient tells me that he is doing okay, he reports continued suicidal ideation and expresses auditory hallucinations are there they are closer to a whisper now compared to when he came in, he does report continue visual hallucinations to staff as well.      Review of Systems    No dizziness, no seizures, no headaches, no nausea/vomiting.  Patient reports pain in his back and neck    OBJECTIVE      PHYSICAL  "EXAM:  Vital signs: Reviewed and listed below  Gait and station: Steady   Muscle tone/strength: Symmetrical movements of extremities    Temp:  [97.9 °F (36.6 °C)-98.1 °F (36.7 °C)] 97.9 °F (36.6 °C)  Heart Rate:  [74-77] 77  Resp:  [16] 16  BP: (136-147)/(76-77) 147/77    MENTAL STATUS EXAM:  Appearance: Casually dressed, fair hygiene.   Behavior: Cooperative with interview, good eye contact  Psychomotor: No psychomotor agitation, No EPS reported or observed  Speech: Regular rate, rhythm and tone.  Mood: \"Depressed\"   Affect: Congruent, but noted to be more pleasant and respectful in general.  Thought Content: no overt delusional material present  Thought process: Improved organization noted.  Suicidality: He continues to endorse suicidal ideation  Homicidality: No HI  Perception: Reports still hearing whispers. AH/VH  Insight: limited   Judgment: limited       Lab Results (last 24 hours)       ** No results found for the last 24 hours. **               Imaging Results (Last 24 Hours)       ** No results found for the last 24 hours. **               ECG/EMG Results (most recent)       Procedure Component Value Units Date/Time    ECG 12 Lead Other; Baseline Cardiac Status [[id number]] Collected: 04/03/24 1856     Updated: 04/05/24 0900     QT Interval 400 ms      QTC Interval 434 ms     Narrative:      Test Reason : Other~  Blood Pressure :   */*   mmHG  Vent. Rate :  71 BPM     Atrial Rate :  71 BPM     P-R Int : 144 ms          QRS Dur :  92 ms      QT Int : 400 ms       P-R-T Axes :  78  41  37 degrees     QTc Int : 434 ms    Normal sinus rhythm  ST elevation, probably due to early repolarization  Borderline ECG  No previous ECGs available  Confirmed by REID FALL ([id number]) on 4/5/2024 9:00:00 AM    Referred By:            Confirmed By: REID FALL             ALLERGIES: Patient has no known allergies.      Current Facility-Administered Medications:     acetaminophen (TYLENOL) tablet 650 mg, 650 mg, Oral, Q4H PRN, " Chelita Mcgowan MD, 650 mg at 04/04/24 2034    aluminum-magnesium hydroxide-simethicone (MAALOX MAX) 400-400-40 MG/5ML suspension 15 mL, 15 mL, Oral, Q6H PRN, Chelita Mcgowan MD, 15 mL at 04/02/24 2009    ARIPiprazole (ABILIFY) tablet 10 mg, 10 mg, Oral, Daily, Chelita Mcgowan MD, 10 mg at 04/05/24 0836    Diclofenac Sodium (VOLTAREN) 1 % gel 4 g, 4 g, Topical, TID, Chelita Mcgowan MD, 4 g at 04/03/24 2007    hydrOXYzine (ATARAX) tablet 50 mg, 50 mg, Oral, Q6H PRN, Chelita Mcgowan MD, 50 mg at 04/04/24 2034    ibuprofen (ADVIL,MOTRIN) tablet 600 mg, 600 mg, Oral, Q6H PRN, Chelita Mcgowan MD, 600 mg at 04/05/24 0836    loperamide (IMODIUM) capsule 2 mg, 2 mg, Oral, Q2H PRN, Chelita Mcgowan MD, 2 mg at 04/02/24 0214    magnesium hydroxide (MILK OF MAGNESIA) suspension 10 mL, 10 mL, Oral, Daily PRN, Chelita Mcgowan MD, 10 mL at 04/05/24 0836    pantoprazole (PROTONIX) EC tablet 40 mg, 40 mg, Oral, QAM AC, Chelita Mcgowan MD, 40 mg at 04/05/24 0836    sertraline (ZOLOFT) tablet 50 mg, 50 mg, Oral, Daily, Chelita Mcgowan MD, 50 mg at 04/05/24 0836    traZODone (DESYREL) tablet 50 mg, 50 mg, Oral, Nightly PRN, Chelita Mcgowan MD, 50 mg at 04/04/24 2034          ASSESSMENT & PLAN:      Progress towards treatment plans and goals: Compliant with medications, attending groups, and individual therapy.  Rationale for continued level of care: Patient continues to need inpatient level of care for stabilization of psychiatric symptoms.  Patient would potentially decompensate further at a lower level of care.       Diagnosis:      Suicidal ideations    Schizoaffective disorder, bipolar type        Plan:    -Continue hospitalization for safety and stabilization.  -Continue current precautions and safety checks.  -Encourage group participation in therapeutic activities on the unit to increase coping skills for stressful life events.  -Continue individual therapy.  -Continue to discuss case in treatment team meetings and continue  discharge planning.  -Risk versus benefit as well as alternatives have been discussed with the patient.  We will continue to monitor for negative and positive effects to medications.  Patient agrees to make the following changes in     Medications:   Patient received his increased dose of Zoloft to 50 mgs today.   He has been tolerating this without complaint.      Other:   Abilify increased yesterday to 10mgs and patient starting to show response to this med.  He likely will need increase in the coming days.         ARIPiprazole, 10 mg, Oral, Daily  Diclofenac Sodium, 4 g, Topical, TID  pantoprazole, 40 mg, Oral, QAM AC  sertraline, 50 mg, Oral, Daily          I spent 18 minutes before, during and after on this encounter date of service, discussing case with treatment team, reviewing chart, interviewing and evaluating the patient, educating and counseling the patient, assessing patients immediate risk, weighing risks associated with most appropriate level of care, formulating assessment and plan, documentation, writing orders, and communication with RN and staff.      Psychiatrist:  Chelita Escalante MD  04/05/24  19:54 EDT

## 2024-04-05 NOTE — SIGNIFICANT NOTE
04/05/24 1318   Group Therapy Session   Group Attendance attended group session   Time Session Began 1130   Time Session Ended 1200   Total Time (minutes) 30   Group Type recreation   Group Topic Covered mindfulness;relaxation techniques   Group Session Detail Patient participated in group activity and discussion about progressive muscle relaxation.   Patient Participation/Contribution closed eyes for most of session   Patient Participation Detail Patient sat in the back of the room with his eyes closed. Patient did not particpate in discussion or activity.   Affect During Group appropriate to situation   Degree of Insight low

## 2024-04-05 NOTE — SIGNIFICANT NOTE
04/05/24 1541   Group Therapy Session   Group Attendance attended group session   Time Session Began 1500   Time Session Ended 1530   Total Time (minutes) 30   Group Type recreation   Group Topic Covered balanced lifestyle   Group Session Detail Patient participated in chair yoga activity to promote physical and mental well being.   Patient Participation/Contribution refused to participate   Patient Participation Detail Patient sat in group room but did not participate in activity and most of the verbal contributions to discussion were off topic.   Affect During Group affect consistent with mood;appropriate to situation   Degree of Insight low        Questions answered   appt given

## 2024-04-05 NOTE — PROGRESS NOTES
INPATIENT PSYCHIATRIC PROGRESS NOTE    Name:  Juani Johnson  :  1970  MRN:  [id number]  Visit Number:  [id number]  Length of stay:  2    This provider is located at her home address in [place] on file with [facility]. This visit is being done on behalf of [facility] using a secure platform for a video visit in a secure and private environment. The Patient is located at [facility]-on a locked Behavioral Health unit. The patient's condition being diagnosed/treated is appropriate for telemedicine. The provider identified herself as well as her credentials. The patient, and/or patient's guardian, consent to being seen remotely, and when consent is given they understand that the consent allows for patient identifiable information to be sent to a third party as needed. They may refuse to be seen remotely at any time. The electronic data is encrypted and password protected, and the patient and/or guardian has been advised of the potential risks to privacy not withstanding such measures.    SUBJECTIVE    CC/Focus of Exam: Inpatient follow up    INTERVAL HISTORY:   Patient seen chart reviewed and case discussed in treatment team. Staff report no major behavioral problems overnight.  Patient attending groups and appropriate with peers and staff on the unit.  Staff report that patient continues to do so show signs of psychosis and disorganization and delusional thoughts.  PRNs overnight given: Tylenol, hydroxyzine, ibuprofen, milk of mag, and trazodone.    On interview today patient tells me on interview today patient is pleasant, he does have moments where he is linear and answers questions, but he easily falls back into a disorganized state.  His thoughts are often expressed in words that rhyme.    He does endorse having continued suicidal ideation as well as having auditory hallucinations.  He tells me that he is also seeing things and though becomes quite disorganized  "when describing this he reports seeing different colors.      Review of Systems    No dizziness, no seizures, no headaches, no nausea/vomiting.    OBJECTIVE      PHYSICAL EXAM:  Vital signs: Reviewed and listed below  Gait and station: Steady   Muscle tone/strength: Symmetrical movements of extremities    Temp:  [97.9 °F (36.6 °C)] 97.9 °F (36.6 °C)  Heart Rate:  [72] 72  Resp:  [14] 14  BP: (154)/(88) 154/88    MENTAL STATUS EXAM:  Appearance: Casually dressed, fair hygiene.   Behavior: Cooperative with interview, good eye contact  Psychomotor: No psychomotor agitation, No EPS reported or observed  Speech: Regular rate, rhythm and tone.  Mood: \"Depressed\"   Affect: congruent  Thought Content: delusional material present, hyperreligious  Thought process: Quite disorganized at times speaking in rhyming words.  Suicidality: Yes  Homicidality: No HI  Perception: + for AH/VH  Insight: fair   Judgment: limited       Lab Results (last 24 hours)       ** No results found for the last 24 hours. **               Imaging Results (Last 24 Hours)       ** No results found for the last 24 hours. **               ECG/EMG Results (most recent)       Procedure Component Value Units Date/Time    ECG 12 Lead Other; Baseline Cardiac Status [[id number]] Collected: 04/03/24 1856     Updated: 04/04/24 1143     QT Interval 400 ms      QTC Interval 434 ms     Narrative:      Test Reason : Other~  Blood Pressure :   */*   mmHG  Vent. Rate :  71 BPM     Atrial Rate :  71 BPM     P-R Int : 144 ms          QRS Dur :  92 ms      QT Int : 400 ms       P-R-T Axes :  78  41  37 degrees     QTc Int : 434 ms    Normal sinus rhythm  ST elevation, probably due to early repolarization  Borderline ECG  No previous ECGs available    Referred By:            Confirmed By:              ALLERGIES: Patient has no known allergies.      Current Facility-Administered Medications:     acetaminophen (TYLENOL) tablet 650 mg, 650 mg, Oral, Q4H PRN, Chelita Mcgowan, " MD, 650 mg at 04/03/24 0907    aluminum-magnesium hydroxide-simethicone (MAALOX MAX) 400-400-40 MG/5ML suspension 15 mL, 15 mL, Oral, Q6H PRN, Chelita Mcgowan MD, 15 mL at 04/02/24 2009    [START ON 4/5/2024] ARIPiprazole (ABILIFY) tablet 10 mg, 10 mg, Oral, Daily, Chelita Mcgowan MD    Diclofenac Sodium (VOLTAREN) 1 % gel 4 g, 4 g, Topical, TID, Chelita Mcgowan MD, 4 g at 04/03/24 2007    hydrOXYzine (ATARAX) tablet 50 mg, 50 mg, Oral, Q6H PRN, Chelita Mcgowan MD, 50 mg at 04/03/24 2008    ibuprofen (ADVIL,MOTRIN) tablet 600 mg, 600 mg, Oral, Q6H PRN, Chelita Mcgowan MD, 600 mg at 04/03/24 2008    loperamide (IMODIUM) capsule 2 mg, 2 mg, Oral, Q2H PRN, Chelita Mcgowan MD, 2 mg at 04/02/24 0214    magnesium hydroxide (MILK OF MAGNESIA) suspension 10 mL, 10 mL, Oral, Daily PRN, Chelita Mcgowan MD, 10 mL at 04/03/24 0911    pantoprazole (PROTONIX) EC tablet 40 mg, 40 mg, Oral, QAM AC, Chelita Mcgowan MD, 40 mg at 04/04/24 0833    [START ON 4/5/2024] sertraline (ZOLOFT) tablet 50 mg, 50 mg, Oral, Daily, Chelita Mcgowan MD    traZODone (DESYREL) tablet 50 mg, 50 mg, Oral, Nightly PRN, Chelita Mcgoawn MD, 50 mg at 04/03/24 2008    Reviewed chart, notes, vitals, labs and EKG personally reviewed.      ASSESSMENT & PLAN:      Progress towards treatment plans and goals: Compliant with medications, attending groups, and individual therapy.  Rationale for continued level of care: Patient continues to need inpatient level of care for stabilization of psychiatric symptoms.  Patient would potentially further decompensated a lower level of care.       Diagnosis:      Suicidal ideations    Schizoaffective disorder, bipolar type        Plan:    -Continue hospitalization for safety and stabilization.  -Continue current precautions and safety checks.  -Encourage group participation in therapeutic activities on the unit to increase coping skills for stressful life events.  -Continue individual therapy.  -Continue to discuss case in  treatment team meetings and continue discharge planning.  -Risk versus benefit as well as alternatives have been discussed with the patient.  We will continue to monitor for negative and positive effects to medications.  Patient agrees to make the following changes in Medications: Patient given additional 5 mg of Abilify today which will get him up to the 10 mg dose.   We will plan to increase Zoloft to 50 mg tomorrow, patient continues to endorse depressive thoughts while suicidal ideation, and hallucinations.         [START ON 4/5/2024] ARIPiprazole, 10 mg, Oral, Daily  Diclofenac Sodium, 4 g, Topical, TID  pantoprazole, 40 mg, Oral, QAM AC  [START ON 4/5/2024] sertraline, 50 mg, Oral, Daily             Psychiatrist:  Chelita Mcgowan MD  04/04/24  20:08 EDT

## 2024-04-05 NOTE — PLAN OF CARE
Goal Outcome Evaluation:  Plan of Care Reviewed With: patient  Patient Agreement with Plan of Care: agrees  Consent Given to Review Plan with: Pt pleasant and cooperative, still c/o pain in his neck and down to extremities, mostly denies prn advil or tylenol stating it does not work, attends groups, continues to endorse SI at times, admits to some AVH, rates anxiety 7/10 and depression 9/10, continue to monitor

## 2024-04-05 NOTE — SIGNIFICANT NOTE
04/05/24 1641   Group Therapy Session   Group Attendance attended group session   Time Session Began 1415   Time Session Ended 1500   Total Time (minutes) 45   Group Type psychotherapeutic;expressive therapy   Group Topic Covered coping skills/lifestyle management   Literature/Videos Given topic handouts   Literature/Videos Given Comments Utilized art activity to identify personal strengths, protective factors and coping skills to improve mood and promote relaxation and distraction.   Patient Participation/Contribution refused to participate

## 2024-04-06 RX ORDER — ARIPIPRAZOLE 5 MG/1
15 TABLET ORAL DAILY
Status: DISCONTINUED | OUTPATIENT
Start: 2024-04-07 | End: 2024-04-11 | Stop reason: HOSPADM

## 2024-04-06 RX ADMIN — SERTRALINE HYDROCHLORIDE 50 MG: 50 TABLET ORAL at 08:02

## 2024-04-06 RX ADMIN — HYDROXYZINE HYDROCHLORIDE 50 MG: 25 TABLET ORAL at 20:45

## 2024-04-06 RX ADMIN — PANTOPRAZOLE SODIUM 40 MG: 40 TABLET, DELAYED RELEASE ORAL at 08:02

## 2024-04-06 RX ADMIN — ARIPIPRAZOLE 10 MG: 5 TABLET ORAL at 08:02

## 2024-04-06 RX ADMIN — ACETAMINOPHEN 650 MG: 325 TABLET, FILM COATED ORAL at 20:44

## 2024-04-06 RX ADMIN — IBUPROFEN 600 MG: 600 TABLET, FILM COATED ORAL at 20:45

## 2024-04-06 RX ADMIN — TRAZODONE HYDROCHLORIDE 50 MG: 50 TABLET ORAL at 20:44

## 2024-04-06 NOTE — PLAN OF CARE
Goal Outcome Evaluation:  Plan of Care Reviewed With: patient  Patient Agreement with Plan of Care: agrees     Progress: no change  Outcome Evaluation: no acute events during shift at this time. VSS. pt denies SI/HI. pt reports AH of voices, VH of lights and colors. pt reports anxiety 6/10 & depression 5/10. PRN atarax and trazodone given. no other changes in pt condition. continue plan of care.

## 2024-04-06 NOTE — THERAPY TREATMENT NOTE
"DATA:    Therapist met with the patient individually. Therapist continues reviewing plan of care and aftercare plan.  The patient was agreeable.    ASSESSMENT:    Patient was seen for follow up of SI and hallucinations.      Today, patient was seen 1-1 in office. The patient's mood appears irritated, affect congruent, thought content disorganized and Latter-day in nature. Patient reports mood is \"mediocre. I always got that pressure on me, pressure on my neck that pushes downward on my spine and is aggravating.\" Patient reports sleep is  \"alright\"   and appetite is  \"alright\" . On scale 1-10, patient rates depression  \"its high\"   and anxiety  \"not as high\" . Patient continues to report AVH including \"\"Occasionally, its got to do with that problem on my neck, I look at like objects, and it will be a different color and in a different form. I was just seeing a dark shadow coming off the door. I hear a tinge of the larry of death, she monitors me and calls me her person and doesn't want to let go of me.\" When exploring current SI patient states \"\"slightly, I was thinking about running and jumping off a bridge with god. God told me if I dont kill myself I would be in pretty bad shape.\" When exploring death wish patient states, \"not really, occasionally I think about being in heaven down here cause it is hell on earth down here.\" Patient denies HI. Patient presents with clanging speech at times and will ramble nonsensically. Patient identifies main goal as \"to not be as homeless.\"     CLINICAL MANEUVERING:    Therapist provided safe, secure environment for patient to share.  Provided reflective listening and psychoeducation.  Assisted patient in processing the above session. Assisted patient in identifying any questions or needs to be addressed today. Attempted to explore and identify treatment goals.  Patient difficult to engage in therapeutic intervention at times due to symptom burden.        Plan:     Patient to " remain hospitalized this date.      Treatment team will focus efforts on stabilizing patient's acute symptoms while providing education on healthy coping and crisis management to reduce hospitalizations.   Patient requires daily psychiatrist evaluation and 24/7 nursing supervision to promote patient  safety.     Therapist will offer 1-4 individual sessions, family education, and appropriate referral.     Therapist recommends continued assessment.

## 2024-04-06 NOTE — PROGRESS NOTES
"INPATIENT PSYCHIATRIC PROGRESS NOTE    Name:  Juani Johsnon  :  1970  MRN:  [id number]  Visit Number:  [id number]  Length of stay:  4  SUBJECTIVE    CC/Focus of Exam: Inpatient follow up    INTERVAL HISTORY:   Patient seen chart reviewed and case discussed in treatment team.  Staff reported no acute events overnight.  The patient denied suicidal or homicidal ideation, however, the patient did endorse auditory hallucination as well as visual hallucination of lights and colors to nurses.  The patient reported anxiety as 6 out of 10 and depression as 5 out of 10 per nurse.  The patient received trazodone and hydroxyzine overnight as needed.  The patient was compliant with scheduled medications including Abilify and Zoloft.    The patient was calm and cooperative, he followed direction.  He was able to ambulate himself into the office.  He was not actively responding to internal stimuli per my observation today.  He did have somewhat constricted affect.  His thought process can be somewhat tangential,  yet he was able to answer some questions linearly.  Speech was clear and overall easy to understand.  He was overall disheveled, long beard and long hair, however, staff report that they already cut out a lot of his hair.      The patient reports that he was off his medications and was homeless, so that is why he came to the hospital.  He says because of segregation of Christians, he moved from [place] to [place], and has not obtained service for mental illness since being here.  He does not endorse his auditory hallucination coming and going, \"God is good she is the arch larry, I see the larry of death\".  He reports the voices are is somewhat bothersome, and he is willing to take medications to get them away.  He also endorses seeing shadows.  He endorses issues with his vision, \"it is just weak\".  He reports he used to have glasses, but elected at one of the hospitals.      He reports the medications " "are helpful for his voices, but he wants the voices to go away completely.  He reports he is sleeping well.He endorses having occasional suicidal ideation still.    Review of Systems    No dizziness, no seizures, no headaches, no nausea/vomiting.  Patient reports pain in his back and neck    OBJECTIVE      PHYSICAL EXAM:  Vital signs: Reviewed and listed below  Gait and station: Steady   Muscle tone/strength: Symmetrical movements of extremities    Temp:  [97.4 °F (36.3 °C)-98.2 °F (36.8 °C)] 97.4 °F (36.3 °C)  Heart Rate:  [69-76] 69  Resp:  [16] 16  BP: (149-153)/(88-95) 149/95    MENTAL STATUS EXAM:  Appearance: Casually dressed, fair hygiene.   Behavior: Cooperative with interview, good eye contact  Psychomotor: No psychomotor agitation, No EPS reported or observed  Speech: Regular rate, rhythm and tone.  Mood: \"Depressed\"   Affect: Congruent, but noted to be more pleasant and respectful in general.  Thought Content: no overt delusional material present  Thought process: Improved organization noted.  Suicidality: He continues to endorse suicidal ideation  Homicidality: No HI  Perception: Reports still hearing whispers. AH/  Insight: limited   Judgment: limited       Lab Results (last 24 hours)       ** No results found for the last 24 hours. **               Imaging Results (Last 24 Hours)       ** No results found for the last 24 hours. **               ECG/EMG Results (most recent)       Procedure Component Value Units Date/Time    ECG 12 Lead Other; Baseline Cardiac Status [[id number]] Collected: 04/03/24 1856     Updated: 04/05/24 0900     QT Interval 400 ms      QTC Interval 434 ms     Narrative:      Test Reason : Other~  Blood Pressure :   */*   mmHG  Vent. Rate :  71 BPM     Atrial Rate :  71 BPM     P-R Int : 144 ms          QRS Dur :  92 ms      QT Int : 400 ms       P-R-T Axes :  78  41  37 degrees     QTc Int : 434 ms    Normal sinus rhythm  ST elevation, probably due to early " repolarization  Borderline ECG  No previous ECGs available  Confirmed by REID FALL ([id number]) on 4/5/2024 9:00:00 AM    Referred By:            Confirmed By: REID FALL             ALLERGIES: Patient has no known allergies.      Current Facility-Administered Medications:     acetaminophen (TYLENOL) tablet 650 mg, 650 mg, Oral, Q4H PRN, Chelita Mcgowan MD, 650 mg at 04/04/24 2034    aluminum-magnesium hydroxide-simethicone (MAALOX MAX) 400-400-40 MG/5ML suspension 15 mL, 15 mL, Oral, Q6H PRN, Chelita Mcgowan MD, 15 mL at 04/02/24 2009    [START ON 4/7/2024] ARIPiprazole (ABILIFY) tablet 15 mg, 15 mg, Oral, Daily, Wang, Weilun, MD    Diclofenac Sodium (VOLTAREN) 1 % gel 4 g, 4 g, Topical, TID, Chelita Mcgowan MD, 4 g at 04/03/24 2007    hydrOXYzine (ATARAX) tablet 50 mg, 50 mg, Oral, Q6H PRN, Chelita Mcgowan MD, 50 mg at 04/05/24 2106    ibuprofen (ADVIL,MOTRIN) tablet 600 mg, 600 mg, Oral, Q6H PRN, Chelita Mcgowan MD, 600 mg at 04/05/24 0836    loperamide (IMODIUM) capsule 2 mg, 2 mg, Oral, Q2H PRN, Chelita Mcgowan MD, 2 mg at 04/02/24 0214    magnesium hydroxide (MILK OF MAGNESIA) suspension 10 mL, 10 mL, Oral, Daily PRN, Chelita Mcgowan MD, 10 mL at 04/05/24 0836    pantoprazole (PROTONIX) EC tablet 40 mg, 40 mg, Oral, QAM AC, Chelita Mcgowan MD, 40 mg at 04/06/24 0802    sertraline (ZOLOFT) tablet 50 mg, 50 mg, Oral, Daily, Chelita Mcgowan MD, 50 mg at 04/06/24 0802    traZODone (DESYREL) tablet 50 mg, 50 mg, Oral, Nightly PRN, Chelita Mcgowan MD, 50 mg at 04/05/24 0384          ASSESSMENT & PLAN:      Progress towards treatment plans and goals: Compliant with medications, attending groups, and individual therapy.  Rationale for continued level of care: Patient continues to need inpatient level of care for stabilization of psychiatric symptoms.  Patient would potentially decompensate further at a lower level of care.       Diagnosis:      Suicidal ideations    Schizoaffective disorder, bipolar  type        Plan:    -Continue hospitalization for safety and stabilization.  -Continue current precautions and safety checks.  -Encourage group participation in therapeutic activities on the unit to increase coping skills for stressful life events.  -Continue individual therapy.  -Continue to discuss case in treatment team meetings and continue discharge planning.  -Risk versus benefit as well as alternatives have been discussed with the patient.  We will continue to monitor for negative and positive effects to medications.  Patient agrees to make the following changes in   -Encouraged patient to be go see his eye doctor for his chronic vision issues patient voices understanding    Medications:   Increase Abilify to 15 mg daily due to patient tolerating 10 mg well, and continued auditory hallucinations despite being on 10 mg    [START ON 4/7/2024] ARIPiprazole, 15 mg, Oral, Daily  Diclofenac Sodium, 4 g, Topical, TID  pantoprazole, 40 mg, Oral, QAM AC  sertraline, 50 mg, Oral, Daily         Psychiatrist:  Weilun Wang, MD  04/06/24  13:58 EDT

## 2024-04-06 NOTE — PLAN OF CARE
Goal Outcome Evaluation:  Plan of Care Reviewed With: patient  Patient Agreement with Plan of Care: agrees     Progress: no change  Outcome Evaluation: Pt continues to report SI but denies plan.  Pt also reports occasional auditory hallucinations.  Pt rates anxiety 7/10 and depression 8/10.  No PRN's required this shift.

## 2024-04-07 RX ADMIN — IBUPROFEN 600 MG: 600 TABLET, FILM COATED ORAL at 20:45

## 2024-04-07 RX ADMIN — SERTRALINE HYDROCHLORIDE 50 MG: 50 TABLET ORAL at 08:24

## 2024-04-07 RX ADMIN — ACETAMINOPHEN 650 MG: 325 TABLET, FILM COATED ORAL at 08:25

## 2024-04-07 RX ADMIN — HYDROXYZINE HYDROCHLORIDE 50 MG: 25 TABLET ORAL at 08:25

## 2024-04-07 RX ADMIN — PANTOPRAZOLE SODIUM 40 MG: 40 TABLET, DELAYED RELEASE ORAL at 08:25

## 2024-04-07 RX ADMIN — TRAZODONE HYDROCHLORIDE 50 MG: 50 TABLET ORAL at 20:46

## 2024-04-07 RX ADMIN — ARIPIPRAZOLE 15 MG: 5 TABLET ORAL at 08:25

## 2024-04-07 RX ADMIN — HYDROXYZINE HYDROCHLORIDE 50 MG: 25 TABLET ORAL at 20:45

## 2024-04-07 RX ADMIN — ACETAMINOPHEN 650 MG: 325 TABLET, FILM COATED ORAL at 20:45

## 2024-04-07 NOTE — PLAN OF CARE
"Goal Outcome Evaluation:  Plan of Care Reviewed With: patient  Patient Agreement with Plan of Care: agrees     Progress: no change  Outcome Evaluation: no acute events during shift at this time. VSS. denies HI. reports SI \"sometimes\". pt reports AVH of the \"larry of death\" talking to him & \"white tracers\" coming off the TV. pt reports anxiety 7/10 & depression 8/10. PRN tylenol, ibuprofen, atarax, trazodone given. no other changes in pt condition at this time. continue plan of care.                               "

## 2024-04-07 NOTE — SIGNIFICANT NOTE
04/07/24 1516   Group Therapy Session   Group Attendance attended group session   Time Session Began 1300   Time Session Ended 1444   Total Time (minutes) 105   Group Type recreation   Group Topic Covered emotions/expression   Group Session Detail Patient participated in activity to promote discussion about overcoming fears.   Patient Participation/Contribution closed eyes for most of session   Patient Participation Detail Patient sat in group but slept for most of group.   Affect During Group affect consistent with mood;appropriate to situation   Degree of Insight low

## 2024-04-07 NOTE — PLAN OF CARE
Goal Outcome Evaluation:  Plan of Care Reviewed With: patient  Patient Agreement with Plan of Care: agrees     Progress: improving  Outcome Evaluation: Pt continues to report intermittent SI but denies plan.  Pt also continues to report auditory hallucinations of larry of death.  Pt rates anxiety/depression 8/10.  Atarax given PRN.  Pt also reports generalized pain 7/10 and received PRN Tylenol.  Pt calm and cooperative this shift with no acute events.

## 2024-04-07 NOTE — PROGRESS NOTES
"INPATIENT PSYCHIATRIC PROGRESS NOTE    Name:  Juani Johnson  :  1970  MRN:  [id number]  Visit Number:  [id number]  Length of stay:  5  SUBJECTIVE    CC/Focus of Exam: Inpatient follow up    INTERVAL HISTORY:   Patient seen chart reviewed and case discussed in treatment team.  Staff reported no acute events overnight.  The patient denied any homicidal ideation, however he endorsed some occasional SI, and some auditory and visual hallucinations as well.  The patient rated anxiety as 7 out of 10, and depression as 8 out of 10.  The patient required as needed hydroxyzine twice and as needed trazodone as well.    On evaluation, the patient was calm and cooperative, and follows directions.  Nevertheless when I asked him about his hallucinations, he did start to have a lot of Cl[employer] associations-for example, he reports enjoyed talking to him \"quibie, gobie, gillie, gobie\".  He is somewhat redirectable though.  He reports the hallucinations have been part of the time.  He does still endorse occasional suicidal ideation without a specific plan.  He reports he slept well last night.  He says medications are working well without side effects.    Review of Systems    No dizziness, no seizures, no headaches, no nausea/vomiting.  Patient reports pain in his back and neck    OBJECTIVE      PHYSICAL EXAM:  Vital signs: Reviewed and listed below  Gait and station: Steady   Muscle tone/strength: Symmetrical movements of extremities    Temp:  [98.1 °F (36.7 °C)-98.2 °F (36.8 °C)] 98.1 °F (36.7 °C)  Heart Rate:  [] 78  Resp:  [16-17] 17  BP: (128-135)/(80-84) 128/80    MENTAL STATUS EXAM:  Appearance: Casually dressed, fair hygiene.   Behavior: Cooperative with interview, good eye contact  Psychomotor: No psychomotor agitation, No EPS reported or observed  Speech: Regular rate, rhythm and tone.  Mood: \"Depressed\"   Affect: Congruent, but noted to be more pleasant and respectful in general.  Thought Content: no overt " delusional material present  Thought process: Improved organization noted.  Suicidality: He continues to endorse suicidal ideation  Homicidality: No HI  Perception: Reports still hearing whispers. AH/VH  Insight: limited   Judgment: limited       Lab Results (last 24 hours)       ** No results found for the last 24 hours. **               Imaging Results (Last 24 Hours)       ** No results found for the last 24 hours. **               ECG/EMG Results (most recent)       Procedure Component Value Units Date/Time    ECG 12 Lead Other; Baseline Cardiac Status [[id number]] Collected: 04/03/24 1856     Updated: 04/05/24 0900     QT Interval 400 ms      QTC Interval 434 ms     Narrative:      Test Reason : Other~  Blood Pressure :   */*   mmHG  Vent. Rate :  71 BPM     Atrial Rate :  71 BPM     P-R Int : 144 ms          QRS Dur :  92 ms      QT Int : 400 ms       P-R-T Axes :  78  41  37 degrees     QTc Int : 434 ms    Normal sinus rhythm  ST elevation, probably due to early repolarization  Borderline ECG  No previous ECGs available  Confirmed by REID FALL ([id number]) on 4/5/2024 9:00:00 AM    Referred By:            Confirmed By: REID FALL             ALLERGIES: Patient has no known allergies.      Current Facility-Administered Medications:     acetaminophen (TYLENOL) tablet 650 mg, 650 mg, Oral, Q4H PRN, Chelita Mcgowan MD, 650 mg at 04/07/24 0825    aluminum-magnesium hydroxide-simethicone (MAALOX MAX) 400-400-40 MG/5ML suspension 15 mL, 15 mL, Oral, Q6H PRN, Chelita Mcgowan MD, 15 mL at 04/02/24 2009    ARIPiprazole (ABILIFY) tablet 15 mg, 15 mg, Oral, Daily, Wang, Weilun, MD, 15 mg at 04/07/24 0825    Diclofenac Sodium (VOLTAREN) 1 % gel 4 g, 4 g, Topical, TID, Chelita Mcgowan MD, 4 g at 04/03/24 2007    hydrOXYzine (ATARAX) tablet 50 mg, 50 mg, Oral, Q6H PRN, Chelita Mcgowan MD, 50 mg at 04/07/24 0825    ibuprofen (ADVIL,MOTRIN) tablet 600 mg, 600 mg, Oral, Q6H PRN, Chelita Mcgowan MD, 600 mg at 04/06/24 9017     loperamide (IMODIUM) capsule 2 mg, 2 mg, Oral, Q2H PRN, Chelita Mcgowan MD, 2 mg at 04/02/24 0214    magnesium hydroxide (MILK OF MAGNESIA) suspension 10 mL, 10 mL, Oral, Daily PRN, Chelita Mcgowan MD, 10 mL at 04/05/24 0836    pantoprazole (PROTONIX) EC tablet 40 mg, 40 mg, Oral, QAM AC, Chelita Mcgowan MD, 40 mg at 04/07/24 0825    sertraline (ZOLOFT) tablet 50 mg, 50 mg, Oral, Daily, Chelita Mcgowan MD, 50 mg at 04/07/24 0824    traZODone (DESYREL) tablet 50 mg, 50 mg, Oral, Nightly PRN, Chelita Mcgowan MD, 50 mg at 04/06/24 2044          ASSESSMENT & PLAN:      Progress towards treatment plans and goals: Compliant with medications, attending groups, and individual therapy.  Rationale for continued level of care: Patient continues to need inpatient level of care for stabilization of psychiatric symptoms.  Patient would potentially decompensate further at a lower level of care.       Diagnosis:      Suicidal ideations    Schizoaffective disorder, bipolar type        Plan:    -Continue hospitalization for safety and stabilization.  -Continue current precautions and safety checks.  -Encourage group participation in therapeutic activities on the unit to increase coping skills for stressful life events.  -Continue individual therapy.  -Continue to discuss case in treatment team meetings and continue discharge planning.  -Risk versus benefit as well as alternatives have been discussed with the patient.  We will continue to monitor for negative and positive effects to medications.  Patient agrees to make the following changes in   -Encouraged patient to be go see his eye doctor for his chronic vision issues patient voices understanding    Medications:   Continue Abilify 15 mg and Zoloft 50 mg.  No changes today    ARIPiprazole, 15 mg, Oral, Daily  Diclofenac Sodium, 4 g, Topical, TID  pantoprazole, 40 mg, Oral, QAM AC  sertraline, 50 mg, Oral, Daily         Psychiatrist:  Weilun Wang MD  04/07/24  13:15 EDT

## 2024-04-08 RX ADMIN — SERTRALINE HYDROCHLORIDE 50 MG: 50 TABLET ORAL at 08:56

## 2024-04-08 RX ADMIN — ARIPIPRAZOLE 15 MG: 5 TABLET ORAL at 08:56

## 2024-04-08 RX ADMIN — TRAZODONE HYDROCHLORIDE 50 MG: 50 TABLET ORAL at 20:17

## 2024-04-08 RX ADMIN — HYDROXYZINE HYDROCHLORIDE 50 MG: 25 TABLET ORAL at 20:17

## 2024-04-08 RX ADMIN — PANTOPRAZOLE SODIUM 40 MG: 40 TABLET, DELAYED RELEASE ORAL at 08:56

## 2024-04-08 RX ADMIN — ACETAMINOPHEN 650 MG: 325 TABLET, FILM COATED ORAL at 08:57

## 2024-04-08 NOTE — PLAN OF CARE
"Goal Outcome Evaluation:  Plan of Care Reviewed With: patient  Patient Agreement with Plan of Care: agrees     Progress: no change  Outcome Evaluation: no acute events during shift at this time. VSS. pt denies HI. pt reports SI \"sometimes\". pt reports AVH of the \"larry of death \" talking to him & \"floaters comming off the TV\". pt reports anxiety 8/10 & depression 2/10.PRN tylenol, ibuprofen, atarax and trazodone given. no other changes in pt condition at this time. continue plan of care.                               "

## 2024-04-08 NOTE — PROGRESS NOTES
INPATIENT PSYCHIATRIC PROGRESS NOTE    Name:  Juani Johnson  :  1970  MRN:  [id number]  Visit Number:  [id number]  Length of stay:  6    This provider is of site at a location on file with [facility]. This visit is being done on behalf of [facility] using a secure platform for a video visit in a secure and private environment. The Patient is located at [facility]-on a locked Behavioral Health unit. The patient's condition being diagnosed/treated is appropriate for telemedicine. The provider identified herself as well as her credentials. The patient, and/or patient's guardian, consent to being seen remotely, and when consent is given they understand that the consent allows for patient identifiable information to be sent to a third party as needed. They may refuse to be seen remotely at any time. The electronic data is encrypted and password protected, and the patient and/or guardian has been advised of the potential risks to privacy not withstanding such measures.    SUBJECTIVE    CC/Focus of Exam: Inpatient follow up    INTERVAL HISTORY:   Patient seen chart reviewed and case discussed in treatment team. Staff report no major behavioral problems overnight.  Patient attending groups and appropriate with peers and staff on the unit.    PRNs overnight given: Ibuprofen and Trazodone     On interview today patient tells me he still is having SI.  He also endorses AVH.  He reports he slept well and that he ate breakfast.       Review of Systems    No dizziness, no seizures, no headaches, no nausea/vomiting.  Patient does endorse pain.  This is a chronic issues for him, details previously discussed in chart.     OBJECTIVE      PHYSICAL EXAM:  Vital signs: Reviewed and listed below  Gait and station: Steady   Muscle tone/strength: Symmetrical movements of extremities    Temp:  [98.1 °F (36.7 °C)-98.2 °F (36.8 °C)] 98.1 °F (36.7 °C)  Heart Rate:  [] 72  Resp:   [16-18] 18  BP: (121-147)/(83) 121/83    MENTAL STATUS EXAM:  Appearance: Casually dressed, fair hygiene.   Behavior: Cooperative with interview, good eye contact  Psychomotor: No psychomotor agitation, No EPS reported or observed  Speech: Regular rate, rhythm and tone.  Mood: Depressed  Affect: Constricted  Thought Content:   word rhyming noted  Thought process: Some disorganization and rambling noted,   Suicidality: See HPI  Homicidality: No HI  Perception: See HPI  Insight: Limited  Judgment: limited       Lab Results (last 24 hours)       ** No results found for the last 24 hours. **               Imaging Results (Last 24 Hours)       ** No results found for the last 24 hours. **               ECG/EMG Results (most recent)       Procedure Component Value Units Date/Time    ECG 12 Lead Other; Baseline Cardiac Status [[id number]] Collected: 04/03/24 1856     Updated: 04/05/24 0900     QT Interval 400 ms      QTC Interval 434 ms     Narrative:      Test Reason : Other~  Blood Pressure :   */*   mmHG  Vent. Rate :  71 BPM     Atrial Rate :  71 BPM     P-R Int : 144 ms          QRS Dur :  92 ms      QT Int : 400 ms       P-R-T Axes :  78  41  37 degrees     QTc Int : 434 ms    Normal sinus rhythm  ST elevation, probably due to early repolarization  Borderline ECG  No previous ECGs available  Confirmed by REID FALL ([id number]) on 4/5/2024 9:00:00 AM    Referred By:            Confirmed By: REID FALL             ALLERGIES: Patient has no known allergies.      Current Facility-Administered Medications:     acetaminophen (TYLENOL) tablet 650 mg, 650 mg, Oral, Q4H PRN, Chelita Mcgowan MD, 650 mg at 04/08/24 0857    aluminum-magnesium hydroxide-simethicone (MAALOX MAX) 400-400-40 MG/5ML suspension 15 mL, 15 mL, Oral, Q6H PRN, Chelita Mcgowan MD, 15 mL at 04/02/24 2009    ARIPiprazole (ABILIFY) tablet 15 mg, 15 mg, Oral, Daily, Wang, Weilun, MD, 15 mg at 04/08/24 0856    Diclofenac Sodium (VOLTAREN) 1 % gel 4 g, 4 g,  Topical, TID, Chelita Mcgowan MD, 4 g at 04/03/24 2007    hydrOXYzine (ATARAX) tablet 50 mg, 50 mg, Oral, Q6H PRN, Chelita Mcgowan MD, 50 mg at 04/07/24 2045    ibuprofen (ADVIL,MOTRIN) tablet 600 mg, 600 mg, Oral, Q6H PRN, Chelita Mcgowan MD, 600 mg at 04/07/24 2045    loperamide (IMODIUM) capsule 2 mg, 2 mg, Oral, Q2H PRN, Chelita Mcgowan MD, 2 mg at 04/02/24 0214    magnesium hydroxide (MILK OF MAGNESIA) suspension 10 mL, 10 mL, Oral, Daily PRN, Chelita Mcgowan MD, 10 mL at 04/05/24 0836    pantoprazole (PROTONIX) EC tablet 40 mg, 40 mg, Oral, QAM AC, Chelita Mcgowan MD, 40 mg at 04/08/24 0856    sertraline (ZOLOFT) tablet 50 mg, 50 mg, Oral, Daily, Chelita Mcgowan MD, 50 mg at 04/08/24 0856    traZODone (DESYREL) tablet 50 mg, 50 mg, Oral, Nightly PRN, Chelita Mcgowan MD, 50 mg at 04/07/24 2046          ASSESSMENT & PLAN:      Progress towards treatment plans and goals: Compliant with medications, attending groups  Rationale for continued level of care: Patient continues to need inpatient level of care for stabilization of psychiatric symptoms.  Patient would potentially decompensate further at a lower level of care.      Diagnosis:      Suicidal ideations    Schizoaffective disorder, bipolar type        Plan:    -Continue hospitalization for safety and stabilization.  -Continue current precautions and safety checks.  -Encourage group participation in therapeutic activities on the unit to increase coping skills for stressful life events.  -Continue individual therapy.  -Continue to discuss case in treatment team meetings and continue discharge planning.  -Risk versus benefit as well as alternatives have been discussed with the patient.  We will continue to monitor for negative and positive effects to medications.  Patient agrees to make the following changes in     Medications: Will make no additional changes in medications today. Abilify was increased over the weekend.   Will continue with Zoloft as is for now.    Monitor for side effects as well as improvement          ARIPiprazole, 15 mg, Oral, Daily  Diclofenac Sodium, 4 g, Topical, TID  pantoprazole, 40 mg, Oral, QAM AC  sertraline, 50 mg, Oral, Daily          I spent 15 minutes before, during and after on this encounter date of service, discussing case with treatment team, reviewing chart, interviewing and evaluating the patient.   Psychiatrist:  Chelita Mcgowan MD  04/08/24  12:52 EDT

## 2024-04-08 NOTE — THERAPY TREATMENT NOTE
"DATA:    Therapist met with the patient individually. Therapist continues reviewing plan of care and aftercare plan.  The patient was agreeable.    ASSESSMENT:    Patient was seen for follow up of SI and hallucinations.      Today, patient was seen 1-1 in office. The patient's mood appears depressed, affect congruent, thought content normal. Patient reports sleep is Interrupted and appetite is Good. On scale 1-10, patient rates depression 8 and anxiety 7. Patient does/does not report hallucinations: Auditory and Visual. \"I've been feeling jilted, tilted, and gone.\" Patient reports feeling this way for awhile. Patient reports that it's a negative feeling. Patient reports \"I passed the nickel, dime and quarter of it all.\" Patient reports that he has had ongoing suicidal ideation. Patient reports that he hears God and Randall of Death. Patient reports Randall of death \"is quimmy and wanting more death\". Patient reports that God \"says tributes to me.\" Patient reports that God is okay with him, but the Randall of death is warped. Patient reports he was sitting in the bathroom looking at the mirror and it was blue and lights were coming off of it. Patient identifies the back wall near the door seeing a shadow going across. Patient reports that it doesn't bother him but suspects that it's happening because he had aneurism. Patient reports that he had a previous suicide attempt and he was trying to strangle himself. Patient reports it was influenced by the devil, he believes. Patient ends session with saying \"well if you're in a pickle and got a nickel with a sickle\".     CLINICAL MANEUVERING:    Therapist provided safe, secure environment for patient to share.  Provided reflective listening and psychoeducation.  Assisted patient in processing the above session. Assisted patient in identifying any questions or needs to be addressed today. Therapist normalized and validated patient's feelings. Therapist utilized supportive " psychotherapy. Attempted to explore and identify treatment goals. Patient difficult to engage in therapeutic intervention at times due to symptom burden.     Concern was voiced regarding substance use and educated patient on risks associated with use. Patient was advised to abstain from use and educated on community resources that can help with sobriety and recovery.        Plan:     Patient to remain hospitalized this date.      Treatment team will focus efforts on stabilizing patient's acute symptoms while providing education on healthy coping and crisis management to reduce hospitalizations.   Patient requires daily psychiatrist evaluation and 24/7 nursing supervision to promote patient  safety.     Therapist will offer 1-4 individual sessions, family education, and appropriate referral.     Therapist recommends continued assessment.

## 2024-04-09 RX ADMIN — ACETAMINOPHEN 650 MG: 325 TABLET, FILM COATED ORAL at 08:06

## 2024-04-09 RX ADMIN — PANTOPRAZOLE SODIUM 40 MG: 40 TABLET, DELAYED RELEASE ORAL at 08:29

## 2024-04-09 RX ADMIN — SERTRALINE HYDROCHLORIDE 50 MG: 50 TABLET ORAL at 08:29

## 2024-04-09 RX ADMIN — ARIPIPRAZOLE 15 MG: 5 TABLET ORAL at 08:29

## 2024-04-09 RX ADMIN — TRAZODONE HYDROCHLORIDE 50 MG: 50 TABLET ORAL at 20:32

## 2024-04-09 RX ADMIN — HYDROXYZINE HYDROCHLORIDE 50 MG: 25 TABLET ORAL at 20:32

## 2024-04-09 NOTE — SIGNIFICANT NOTE
04/09/24 1450   Group Therapy Session   Group Attendance attended group session   Time Session Began 1400   Time Session Ended 1448   Total Time (minutes) 48   Group Type psychotherapeutic   Group Topic Covered cognitive therapy techniques;cognitive activities;coping skills/lifestyle management;self care activities   Literature/Videos Given topic handouts   Group Session Detail Patient's participated in an art therapy exercise using mandalas as a form of stress relief and as a meditative exercise.   Patient Participation/Contribution cooperative with task   Affect During Group affect consistent with mood   Degree of Insight moderate

## 2024-04-09 NOTE — PLAN OF CARE
"Goal Outcome Evaluation:  Plan of Care Reviewed With: patient  Patient Agreement with Plan of Care: agrees     Progress: improving  Outcome Evaluation: Pt withdrawn but pleasant overnight. Pt reported anxiety of 8/10 and depression of 8/10. Pt denies current SI but states that he has intermittent SI throughout the day that \"comes and goes\". Pt denies HI. Pt reports AVH, stating that he sees colored lights and hears the voices of God and the Randall of Death, who he describes as a \"troublemaker\" and sometimes gives him commands to harm himself. Pt required PRN atarax and trazodone during shift. Pt awoke frequently overnight. Will continue plan of care.                               "

## 2024-04-09 NOTE — SIGNIFICANT NOTE
04/09/24 1040   Group Therapy Session   Group Attendance attended group session   Time Session Began 1030   Time Session Ended 1100   Total Time (minutes) 30   Group Type psychotherapeutic   Group Topic Covered cognitive activities;cognitive therapy techniques   Literature/Videos Given topic handouts   Literature/Videos Given Comments Personal Values: Circles of Influence   Group Session Detail Patient's provided worksheet on personal values which provides a tool for exploring one’s most important values, as well as those of family, friends, and society. The exercise can help patient's explore how other people influence their values, and what unique values they hold.   Patient Participation/Contribution cooperative with task   Patient Participation Detail Literature provided for patient to complete independently.   Affect During Group affect consistent with mood   Degree of Insight moderate

## 2024-04-09 NOTE — SIGNIFICANT NOTE
04/09/24 0856   Living Situation   Current Living Arrangements patient worried   Potentially Unsafe Housing Conditions none   Food Insecurity   Within the past 12 months, you worried that your food would run out before you got the money to buy more. Often true   Within the past 12 months, the food you bought just didn't last and you didn't have money to get more. Sometimes   Transportation Needs   In the past 12 months, has lack of transportation kept you from medical appointments or from getting medications? yes   In the past 12 months, has lack of transportation kept you from meetings, work, or from getting things needed for daily living? Yes   Utilities   In the past 12 months has the electric, gas, oil, or water company threatened to shut off services in your home? No   Abuse Screen (yes response referral indicated)   Feels Unsafe at Home or Work/School no   Feels Threatened by Someone no   Does Anyone Try to Keep You From Having Contact with Others or Doing Things Outside Your Home? no   Physical Signs of Abuse Present no   Financial Resource Strain   How hard is it for you to pay for the very basics like food, housing, medical care, and heating? Very Hard   Employment   Do you want help finding or keeping work or a job? I do not need or want help   Family and Community Support   If for any reason you need help with day-to-day activities such as bathing, preparing meals, shopping, managing finances, etc., do you get the help you need? I need a lot more help   How often do you feel lonely or isolated from those around you? Often   Education   Preferred Language English   Do you want help with school or training? For example, starting or completing job training or getting a high school diploma, GED or equivalent No   Physical Activity   On average, how many days per week do you engage in moderate to strenuous exercise (like a brisk walk)? 7 days   On average, how many minutes do you engage in exercise at this  level? 150+ min   Number of minutes of exercise per week 1050   Alcohol Use   Q1: How often do you have a drink containing alcohol? Never   Q2: How many drinks containing alcohol do you have on a typical day when you are drinking? None   Stress   Do you feel stress - tense, restless, nervous, or anxious, or unable to sleep at night because your mind is troubled all the time - these days? Rather much   Mental Health   Little Interest or Pleasure in Doing Things 3-->nearly every day   Feeling Down, Depressed or Hopeless 3-->nearly every day   Disabilities   Concentrating, Remembering or Making Decisions Difficulty no   Doing Errands Independently Difficulty (such as shopping) no

## 2024-04-09 NOTE — PROGRESS NOTES
"INPATIENT PSYCHIATRIC PROGRESS NOTE    Name:  Juani Johnson  :  1970  MRN:  [id number]  Visit Number:  [id number]  Length of stay:  7    This provider is located at her home address in [place] on file with [facility]. This visit is being done on behalf of [facility] using a secure platform for a video visit in a secure and private environment. The Patient is located at [facility]-on a locked Behavioral Health unit. The patient's condition being diagnosed/treated is appropriate for telemedicine. The provider identified herself as well as her credentials. The patient, and/or patient's guardian, consent to being seen remotely, and when consent is given they understand that the consent allows for patient identifiable information to be sent to a third party as needed. They may refuse to be seen remotely at any time. The electronic data is encrypted and password protected, and the patient and/or guardian has been advised of the potential risks to privacy not withstanding such measures.    SUBJECTIVE    CC/Focus of Exam: Inpatient follow up    INTERVAL HISTORY:   Patient seen chart reviewed and case discussed in treatment team. Staff report no major behavioral problems overnight.  Patient attending groups and appropriate with peers and staff on the unit.    PRNs overnight given: Ibuprofen given.    On interview today patient tells me he is doing much better. He denies SI. He denies AH. When asked if he is experiencing AH he tells me \"No, Not really.\"   And just \"some VH\" which he has described as colors in the past.       Review of Systems    No dizziness, no seizures, no headaches, no nausea/vomiting.  +back and neck pain.    OBJECTIVE      PHYSICAL EXAM:  Vital signs: Reviewed and listed below  Gait and station: Steady   Muscle tone/strength: Symmetrical movements of extremities    Temp:  [97.6 °F (36.4 °C)-97.7 °F (36.5 °C)] 97.6 °F (36.4 °C)  Heart Rate:  [72-73] " 73  Resp:  [16-18] 16  BP: (139-150)/(82-85) 139/85    MENTAL STATUS EXAM:  Appearance: Casually dressed, fair hygiene.   Behavior: Cooperative with interview, good eye contact  Psychomotor: No psychomotor agitation, No EPS reported or observed  Speech: Regular rate, rhythm and tone.  Mood: Euthymic  Affect: Congruent  Thought Content: no overt delusional material present  Thought process: generally goal directed  Suicidality: Denies  Homicidality: No HI  Perception: No AH/VH  Insight: fair   Judgment: limited       Lab Results (last 24 hours)       ** No results found for the last 24 hours. **               Imaging Results (Last 24 Hours)       ** No results found for the last 24 hours. **               ECG/EMG Results (most recent)       Procedure Component Value Units Date/Time    ECG 12 Lead Other; Baseline Cardiac Status [[id number]] Collected: 04/03/24 1856     Updated: 04/05/24 0900     QT Interval 400 ms      QTC Interval 434 ms     Narrative:      Test Reason : Other~  Blood Pressure :   */*   mmHG  Vent. Rate :  71 BPM     Atrial Rate :  71 BPM     P-R Int : 144 ms          QRS Dur :  92 ms      QT Int : 400 ms       P-R-T Axes :  78  41  37 degrees     QTc Int : 434 ms    Normal sinus rhythm  ST elevation, probably due to early repolarization  Borderline ECG  No previous ECGs available  Confirmed by REID FALL ([id number]) on 4/5/2024 9:00:00 AM    Referred By:            Confirmed By: REID FALL             ALLERGIES: Patient has no known allergies.      Current Facility-Administered Medications:     acetaminophen (TYLENOL) tablet 650 mg, 650 mg, Oral, Q4H PRN, Chelita Mcgowan MD, 650 mg at 04/09/24 0806    aluminum-magnesium hydroxide-simethicone (MAALOX MAX) 400-400-40 MG/5ML suspension 15 mL, 15 mL, Oral, Q6H PRN, Chelita Mcgowan MD, 15 mL at 04/02/24 2009    ARIPiprazole (ABILIFY) tablet 15 mg, 15 mg, Oral, Daily, Wang, Weilun, MD, 15 mg at 04/09/24 0829    Diclofenac Sodium (VOLTAREN) 1 % gel 4 g, 4 g,  Topical, TID, Chelita Mcgowan MD, 4 g at 04/03/24 2007    hydrOXYzine (ATARAX) tablet 50 mg, 50 mg, Oral, Q6H PRN, Chelita Mcgowan MD, 50 mg at 04/08/24 2017    ibuprofen (ADVIL,MOTRIN) tablet 600 mg, 600 mg, Oral, Q6H PRN, Chelita Mcgowan MD, 600 mg at 04/07/24 2045    loperamide (IMODIUM) capsule 2 mg, 2 mg, Oral, Q2H PRN, Chelita Mcgowan MD, 2 mg at 04/02/24 0214    magnesium hydroxide (MILK OF MAGNESIA) suspension 10 mL, 10 mL, Oral, Daily PRN, Chelita Mcgowan MD, 10 mL at 04/05/24 0836    pantoprazole (PROTONIX) EC tablet 40 mg, 40 mg, Oral, QAM AC, Chelita Mcgowan MD, 40 mg at 04/09/24 0829    sertraline (ZOLOFT) tablet 50 mg, 50 mg, Oral, Daily, Chelita Mcgowan MD, 50 mg at 04/09/24 0829    traZODone (DESYREL) tablet 50 mg, 50 mg, Oral, Nightly PRN, Chelita Mcgowan MD, 50 mg at 04/08/24 2017          ASSESSMENT & PLAN:      Progress towards treatment plans and goals: Compliant with medications.    Rationale for continued level of care: Patient has showed improvement in his psychosis and as of today has denied SI.  If he continues to deny  dangerous thoughts, will plan for discharge tomorrow.       Diagnosis:      Suicidal ideations    Schizoaffective disorder, bipolar type        Plan:    -Continue hospitalization for safety and stabilization.  -Continue current precautions and safety checks.  -Encourage group participation in therapeutic activities on the unit to increase coping skills for stressful life events.  -Continue individual therapy.  -Continue to discuss case in treatment team meetings and continue discharge planning.  -Risk versus benefit as well as alternatives have been discussed with the patient.  We will continue to monitor for negative and positive effects to medications.      Medications:Continue current medications without change. Continue to monitor for side effects and or changes in psychiatric symptoms.         ARIPiprazole, 15 mg, Oral, Daily  Diclofenac Sodium, 4 g, Topical,  TID  pantoprazole, 40 mg, Oral, QAM AC  sertraline, 50 mg, Oral, Daily          I spent 15 minutes before, during and after on this encounter date of service, discussing case with treatment team, reviewing chart, interviewing and evaluating the patient, educating and counseling the patient, assessing patients immediate risk, weighing risks associated with most appropriate level of care, formulating assessment and plan, documentation, writing orders, and communication with RN and staff.      Psychiatrist:  Chelita Mcgowan MD  04/09/24  16:39 EDT

## 2024-04-09 NOTE — PLAN OF CARE
"Goal Outcome Evaluation:  Plan of Care Reviewed With: patient  Patient Agreement with Plan of Care: agrees     Patient denies SI/HI. He reports seeing \" different colors.\" He rates his anxiety 5/10 and depression 7/10. He rates his back pain 8/10 and was given PRN Tylenol. He refused Voltaren cream. His appetite is good, but expressed trouble sleeping at night. Patient is cooperative and pleasant. Cont. plan of care.                                      "

## 2024-04-09 NOTE — SIGNIFICANT NOTE
04/09/24 1219   Group Therapy Session   Group Attendance attended group session   Time Session Began 1130   Time Session Ended 1200   Total Time (minutes) 30   Group Type recreation   Group Topic Covered coping skills/lifestyle management   Group Session Detail Patient participated in journaling activity and discussion to promote utilization of coping skills.   Patient Participation/Contribution able to recall/repeat info presented;cooperative with task;discussed personal experience with topic;expressed understanding of topic;listened actively   Patient Participation Detail Patient pleasant and minimally engaged in activity. Patient stated he did not find journaling helpful for him today.   Affect During Group affect consistent with mood;appropriate to situation   Degree of Insight high

## 2024-04-09 NOTE — SIGNIFICANT NOTE
04/09/24 0909   Group Therapy Session   Group Attendance attended group session   Time Session Began 1410   Time Session Ended 1440   Total Time (minutes) 30   Group Type psychoeducation;psychotherapeutic   Group Topic Covered cognitive therapy techniques;cognitive activities   Literature/Videos Given topic videos   Group Session Detail Patient's participated in viewing psychoeducational videos on getting stuck in the negative and how to get unstuck then learning how to handle their feelings.   Patient Participation/Contribution cooperative with task   Affect During Group affect consistent with mood   Degree of Insight low

## 2024-04-09 NOTE — THERAPY TREATMENT NOTE
"DATA:    Therapist met with the patient individually. Therapist continues reviewing plan of care and aftercare plan.  The patient was agreeable.    ASSESSMENT:    Patient was seen for follow up of SI and hallucinations.      Today, patient was seen 1-1 in office. The patient's mood appears appropriate to circumstances, affect congruent, thought content normal. Patient reports sleep is Interrupted and appetite is  \"alright\" . On scale 1-10, patient rates depression 7 and anxiety 6. Patient does/does not report hallucinations: Auditory, Visual, and Tactile. Patient denies SI/HI. Patient reports that he's feeling content. Patient acknowledges that he does have hallucinations but at this time they are not bothering him at this time.      CLINICAL MANEUVERING:    Therapist provided safe, secure environment for patient to share.  Provided reflective listening and psychoeducation.  Assisted patient in processing the above session. Assisted patient in identifying any questions or needs to be addressed today. Therapist utilized supportive psychotherapy.    Plan:     Patient to remain hospitalized this date.      Treatment team will focus efforts on stabilizing patient's acute symptoms while providing education on healthy coping and crisis management to reduce hospitalizations.   Patient requires daily psychiatrist evaluation and 24/7 nursing supervision to promote patient  safety.     Therapist will offer 1-4 individual sessions, family education, and appropriate referral.     Therapist recommends continued assessment.   "

## 2024-04-09 NOTE — SIGNIFICANT NOTE
Therapist reached out to [facility] for potential disposition planning for patient. He has not been to the [facility] before and they are not accepting out of [facility] individuals right now due to their winter shelter closing down. Therapist inquired if she knew anywhere that accepts without an ID but she didn't know of any place that does.

## 2024-04-10 RX ADMIN — ACETAMINOPHEN 650 MG: 325 TABLET, FILM COATED ORAL at 20:12

## 2024-04-10 RX ADMIN — PANTOPRAZOLE SODIUM 40 MG: 40 TABLET, DELAYED RELEASE ORAL at 09:10

## 2024-04-10 RX ADMIN — HYDROXYZINE HYDROCHLORIDE 50 MG: 25 TABLET ORAL at 20:12

## 2024-04-10 RX ADMIN — SERTRALINE HYDROCHLORIDE 50 MG: 50 TABLET ORAL at 09:10

## 2024-04-10 RX ADMIN — ARIPIPRAZOLE 15 MG: 5 TABLET ORAL at 09:10

## 2024-04-10 RX ADMIN — ACETAMINOPHEN 650 MG: 325 TABLET, FILM COATED ORAL at 09:10

## 2024-04-10 RX ADMIN — TRAZODONE HYDROCHLORIDE 50 MG: 50 TABLET ORAL at 20:12

## 2024-04-10 NOTE — PLAN OF CARE
"Goal Outcome Evaluation:  Plan of Care Reviewed With: patient  Patient Agreement with Plan of Care: agrees         Pt. Denies SI/HI. He continues to see \" colors\" and hear voices, but reports that they are getting better. He states that his depression is 6/10 and anxiety is 7/10. He reports his back pain as 8/10, and took PRN tylenol. Continue POC until discharge tomorrow at homeless shelter.                                "

## 2024-04-10 NOTE — SIGNIFICANT NOTE
04/10/24 1228   Group Therapy Session   Group Attendance attended group session   Time Session Began 1130   Time Session Ended 1200   Total Time (minutes) 30   Group Type recreation   Group Topic Covered leisure exploration/use of leisure time   Group Session Detail Patient engaged in watercolor painting to promote exploration of different leisure activities.   Patient Participation/Contribution refused to participate   Patient Participation Detail Patient sat in group room but declined to participate in painting.   Affect During Group affect consistent with mood;appropriate to situation   Degree of Insight moderate

## 2024-04-10 NOTE — PLAN OF CARE
"Goal Outcome Evaluation:  Plan of Care Reviewed With: patient  Patient Agreement with Plan of Care: agrees     Progress: improving  Outcome Evaluation: Pt quiet but pleasant. Pt reports anxiety of 7/10 and depression of 6/10. Pt denies SI but reports that he \"sometimes has thoughts that come and go\". Pt denies HI. Pt denies AVH but reports that he sees colored lights that \"come and go\" and intermittently hears voices, but feels that it is much better than when he first came in to the hospital. Pt recieved atarax and trazodone PRN and slept well overnight. Will continue plan of care.                               "

## 2024-04-10 NOTE — SIGNIFICANT NOTE
This writer found a mission in [place], [place] that accepts individuals without ID's. They have resources to help unhoused individuals obtain identification. A bed was reserved there for the pt for tomorrow. Pt is agreeable. Pt will need transportation down there and will need to be there by 6 pm tomorrow. Upon arrival, ask for Thomas as he will be doing the intake assessment. They are aware he is coming and requested that we call when he leaves so they know he is en route. The main contact is Thomas at [phone]. Pt will be discharging to [facility]. Address is as follows:    [id number]  [place] [address]

## 2024-04-10 NOTE — PROGRESS NOTES
"INPATIENT PSYCHIATRIC PROGRESS NOTE    Name:  Juani Johnson  :  1970  MRN:  [id number]  Visit Number:  [id number]  Length of stay:  8    This provider is location on file with [facility]. This visit is being done on behalf of [facility] using a secure platform for a video visit in a secure and private environment. The Patient is located at [facility]-on a locked Behavioral Health unit. The patient's condition being diagnosed/treated is appropriate for telemedicine. The provider identified herself as well as her credentials. The patient, and/or patient's guardian, consent to being seen remotely, and when consent is given they understand that the consent allows for patient identifiable information to be sent to a third party as needed. They may refuse to be seen remotely at any time. The electronic data is encrypted and password protected, and the patient and/or guardian has been advised of the potential risks to privacy not withstanding such measures.    SUBJECTIVE    CC/Focus of Exam: Inpatient follow up    INTERVAL HISTORY:   Patient seen chart reviewed and case discussed in treatment team. Staff report no major behavioral problems overnight.  Patient attending groups and appropriate with peers and staff on the unit.    PRNs overnight given: Trazodone    On interview today patient tells me that he is doing pretty good.  He reports that his mood is a little down.  And expresses small amounts of suicidal thoughts, and same for auditory hallucinations being\" whispers\" and reports he still continues to see small amounts colors.      Review of Systems    No dizziness, no seizures, no headaches, no nausea/vomiting.    OBJECTIVE      PHYSICAL EXAM:  Vital signs: Reviewed and listed below  Gait and station: Steady   Muscle tone/strength: Symmetrical movements of extremities    Temp:  [98 °F (36.7 °C)-98.3 °F (36.8 °C)] 98.3 °F (36.8 °C)  Heart Rate:  [71-79] " 79  Resp:  [16-18] 16  BP: (131-145)/(78-89) 145/89    MENTAL STATUS EXAM:  Appearance: Casually dressed, fair hygiene.   Behavior: Cooperative with interview, good eye contact  Psychomotor: No psychomotor agitation, No EPS reported or observed  Speech: Regular rate, rhythm and tone.  Mood: Euthymic  Affect: Congruent  Thought Content: no delusional material present  Thought process: generally goal directed  Suicidality: Denies  Homicidality: No HI  Perception: No AH/VH  Insight: fair   Judgment: limited       Lab Results (last 24 hours)       ** No results found for the last 24 hours. **               Imaging Results (Last 24 Hours)       ** No results found for the last 24 hours. **               ECG/EMG Results (most recent)       Procedure Component Value Units Date/Time    ECG 12 Lead Other; Baseline Cardiac Status [[id number]] Collected: 04/03/24 1856     Updated: 04/05/24 0900     QT Interval 400 ms      QTC Interval 434 ms     Narrative:      Test Reason : Other~  Blood Pressure :   */*   mmHG  Vent. Rate :  71 BPM     Atrial Rate :  71 BPM     P-R Int : 144 ms          QRS Dur :  92 ms      QT Int : 400 ms       P-R-T Axes :  78  41  37 degrees     QTc Int : 434 ms    Normal sinus rhythm  ST elevation, probably due to early repolarization  Borderline ECG  No previous ECGs available  Confirmed by REID FALL ([id number]) on 4/5/2024 9:00:00 AM    Referred By:            Confirmed By: REID FALL             ALLERGIES: Patient has no known allergies.      Current Facility-Administered Medications:     acetaminophen (TYLENOL) tablet 650 mg, 650 mg, Oral, Q4H PRN, Chelita Mcgowan MD, 650 mg at 04/10/24 0910    aluminum-magnesium hydroxide-simethicone (MAALOX MAX) 400-400-40 MG/5ML suspension 15 mL, 15 mL, Oral, Q6H PRN, Chelita Mcgowan MD, 15 mL at 04/02/24 2009    ARIPiprazole (ABILIFY) tablet 15 mg, 15 mg, Oral, Daily, Wang, Weil[place], MD, 15 mg at 04/10/24 0910    Diclofenac Sodium (VOLTAREN) 1 % gel 4 g, 4 g,  Topical, TID, Chelita Mcgowan MD, 4 g at 04/03/24 2007    hydrOXYzine (ATARAX) tablet 50 mg, 50 mg, Oral, Q6H PRN, Chelita Mcgowan MD, 50 mg at 04/09/24 2032    ibuprofen (ADVIL,MOTRIN) tablet 600 mg, 600 mg, Oral, Q6H PRN, Chelita Mcgowan MD, 600 mg at 04/07/24 2045    loperamide (IMODIUM) capsule 2 mg, 2 mg, Oral, Q2H PRN, Chelita Mcgowan MD, 2 mg at 04/02/24 0214    magnesium hydroxide (MILK OF MAGNESIA) suspension 10 mL, 10 mL, Oral, Daily PRN, Chelita Mcgowan MD, 10 mL at 04/05/24 0836    pantoprazole (PROTONIX) EC tablet 40 mg, 40 mg, Oral, QAM AC, Chelita Mcgowan MD, 40 mg at 04/10/24 0910    sertraline (ZOLOFT) tablet 50 mg, 50 mg, Oral, Daily, Chelita Mcgowan MD, 50 mg at 04/10/24 0910    traZODone (DESYREL) tablet 50 mg, 50 mg, Oral, Nightly PRN, Chelita Mcgowan MD, 50 mg at 04/09/24 2032          ASSESSMENT & PLAN:      Progress towards treatment plans and goals: Compliant with medications, attending groups, and individual therapy.  Rationale for continued level of care: Patient continues to need inpatient level of care for stabilization of psychiatric symptoms.  Patient would potentially decompensate further at a lower level of care.       Diagnosis:      Suicidal ideations    Schizoaffective disorder, bipolar type        Plan:    -Continue hospitalization for safety and stabilization.  -Continue current precautions and safety checks.  -Encourage group participation in therapeutic activities on the unit to increase coping skills for stressful life events.  -Continue individual therapy.  -Continue to discuss case in treatment team meetings and continue discharge planning.  -Risk versus benefit as well as alternatives have been discussed with the patient.  We will continue to monitor for negative and positive effects to medications.  Patient agrees to make the following changes in     Medications: We will increase Zoloft today to 100 mg for better control of his depression.    Other: Continue discharge  planning in the form of finding a shelter that will allow him to enter given the fact that he has no identification.  Patient reports that he would do okay in a shelter as he has been unhoused for about 6 years.      ARIPiprazole, 15 mg, Oral, Daily  Diclofenac Sodium, 4 g, Topical, TID  pantoprazole, 40 mg, Oral, QAM AC  sertraline, 50 mg, Oral, Daily          I spent 15 minutes before, during and after on this encounter date of service, discussing case with treatment team, reviewing chart, interviewing and evaluating the patient, educating and counseling the patient, assessing patients immediate risk, weighing risks associated with most appropriate level of care, formulating assessment and plan, documentation, writing orders, and communication with RN and staff.      Psychiatrist:  Chelita Mcgowan MD  04/10/24  13:35 EDT

## 2024-04-11 VITALS
HEIGHT: 71 IN | TEMPERATURE: 97.8 F | WEIGHT: 193.8 LBS | OXYGEN SATURATION: 99 % | HEART RATE: 66 BPM | DIASTOLIC BLOOD PRESSURE: 87 MMHG | SYSTOLIC BLOOD PRESSURE: 154 MMHG | BODY MASS INDEX: 27.13 KG/M2 | RESPIRATION RATE: 18 BRPM

## 2024-04-11 PROBLEM — R45.851 SUICIDAL IDEATIONS: Status: RESOLVED | Noted: 2024-04-02 | Resolved: 2024-04-11

## 2024-04-11 RX ORDER — ARIPIPRAZOLE 15 MG/1
15 TABLET ORAL DAILY
Qty: 40 TABLET | Refills: 0 | Status: SHIPPED | OUTPATIENT
Start: 2024-04-12 | End: 2024-05-22

## 2024-04-11 RX ORDER — ARIPIPRAZOLE 15 MG/1
15 TABLET ORAL DAILY
Qty: 30 TABLET | Refills: 0 | Status: CANCELLED | OUTPATIENT
Start: 2024-04-12 | End: 2024-05-12

## 2024-04-11 RX ORDER — SERTRALINE HYDROCHLORIDE 100 MG/1
100 TABLET, FILM COATED ORAL DAILY
Qty: 40 TABLET | Refills: 0 | Status: SHIPPED | OUTPATIENT
Start: 2024-04-12 | End: 2024-05-22

## 2024-04-11 RX ORDER — HYDROXYZINE 50 MG/1
50 TABLET, FILM COATED ORAL EVERY 6 HOURS PRN
Qty: 30 TABLET | Refills: 0 | Status: SHIPPED | OUTPATIENT
Start: 2024-04-11 | End: 2024-05-21

## 2024-04-11 RX ORDER — PERMETHRIN 50 MG/G
1 CREAM TOPICAL ONCE
Status: COMPLETED | OUTPATIENT
Start: 2024-04-11 | End: 2024-04-11

## 2024-04-11 RX ORDER — TRAZODONE HYDROCHLORIDE 50 MG/1
50 TABLET ORAL NIGHTLY PRN
Qty: 40 TABLET | Refills: 0 | Status: SHIPPED | OUTPATIENT
Start: 2024-04-11 | End: 2024-05-21

## 2024-04-11 RX ORDER — PANTOPRAZOLE SODIUM 40 MG/1
40 TABLET, DELAYED RELEASE ORAL
Qty: 40 TABLET | Refills: 0 | Status: SHIPPED | OUTPATIENT
Start: 2024-04-12 | End: 2024-05-22

## 2024-04-11 RX ADMIN — PERMETHRIN 1 APPLICATION: 50 CREAM TOPICAL at 12:25

## 2024-04-11 RX ADMIN — Medication: at 12:25

## 2024-04-11 RX ADMIN — PANTOPRAZOLE SODIUM 40 MG: 40 TABLET, DELAYED RELEASE ORAL at 07:49

## 2024-04-11 RX ADMIN — SERTRALINE HYDROCHLORIDE 100 MG: 50 TABLET ORAL at 08:15

## 2024-04-11 RX ADMIN — ARIPIPRAZOLE 15 MG: 5 TABLET ORAL at 08:15

## 2024-04-11 NOTE — DISCHARGE INSTR - APPOINTMENTS
[facility].  Location: [address] [place], [place] [id number]  Therapy with Chelita Canela 4/18/24 at 1pm. Patient will be seen telehealth at that location due to insurance.  (They requested patient arrive one hour early.)  Follow up for medication will be scheduled at that appointment.

## 2024-04-11 NOTE — DISCHARGE SUMMARY
PSYCHIATRIC DISCHARGE SUMMARY     Patient Identification:  Name:  Juani Johnson  Age:  53 y.o.  Sex:  male  :  1970  MRN:  [id number]  Visit Number:  [id number]    Patient is being seen on this date of discharge using telemedicine technology. This providers location is on file with [facility]. This visit is being done on behalf of [facility] using a secure platform for a video visit in a secure and private environment. The Patient is located at [facility]-on a locked Behavioral Health unit. The patient's condition being diagnosed/treated is appropriate for telemedicine. The provider identified herself as well as her credentials. The patient, and/or patient's guardian, consent to being seen remotely, and when consent is given they understand that the consent allows for patient identifiable information to be sent to a third party as needed. They may refuse to be seen remotely at any time. The electronic data is encrypted and password protected, and the patient and/or guardian has been advised of the potential risks to privacy not withstanding such measures.      Date of Admission:2024   Date of Discharge:  2024    Discharge Diagnosis:  Schizoaffective disorder, bipolar type      Admission Diagnosis:  Suicidal ideations [R45.851]     Hospital Course      Juani Johnsonwas admitted to [facility] at [facility].  Patient was acclimated to the unit.  A comprehensive evaluation and treatment plan were complete and safety and comfort measures implemented.  Home medications were reconciled  Physical examination was completed and admission labs and EKG reviewed in ER prior to admission and reviewed again once on unit during admission to unit.   During hospitalization, patient was noted to be generally cooperative. He was quite disorganized when he originally was admitted.  His thought process did show improvement with the medications given.  "Treatment plans goals were met as patient showed improvement in his Suicidal thoughts as well as improvement with hallucinations.   He did reported improvement in hallucinations as well.  On day of discharge he reported \"Slight\" when asked about AVH.      The following Medication changes were made: Abilify was started and titrated up to 15mgs daily.  Zoloft was started given his reported feelings of depression and titrated up to 100mgs. He had been on SSRI's in the past, without negative side effects. He did regularly take Trazodone to help with sleep and occasionally used Hydroxyzine PRN's.      Risk vs benefit and alternatives to medications were discussed with patient, who agreed with the above medication changes and tolerated them without reported side effects.  Patient was compliant with medications.      Throughout stay patient attended and participated in group therapies on the unit as well as individual therapy.   Patient was calm and cooperative with staff and peers.  Patient was sleeping through the night.  Patient was able to perform ADLs without assistance.  No abnormal movements noted and stable gait observed.    At time of discharge, patient was medically stable, Did consistently complain of some back and neck pain from a chronic injury.  Cognitively intact and had showed improvement in psychiatric functioning.  Patient denied SI, thoughts of self-harm, or HI.  It was felt by the treatment team that patient had reached maximum benefit from inpatient hospitalization and all members agreed patient could safely discharge to a lower level of care.        Mental Status Exam upon discharge:   Mood was euthymic   Affect: appropriate and generally stable  Thought Content: no delusional material present.  Thought process: Generally goal directed and organized.  Suicidality: No SI  Homicidality: No HI  Perception: Pt reports AH/VH or overt signs of psychosis  Insight and Judgement: over all improved since " admission         Consults:   Consults       No orders found from 3/4/2024 to 4/3/2024.            Pertinent Test Results:  Lab Results (last 72 hours)       ** No results found for the last 72 hours. **              ECG/EMG Results (most recent)       Procedure Component Value Units Date/Time    ECG 12 Lead Other; Baseline Cardiac Status [[id number]] Collected: 04/03/24 1856     Updated: 04/05/24 0900     QT Interval 400 ms      QTC Interval 434 ms     Narrative:      Test Reason : Other~  Blood Pressure :   */*   mmHG  Vent. Rate :  71 BPM     Atrial Rate :  71 BPM     P-R Int : 144 ms          QRS Dur :  92 ms      QT Int : 400 ms       P-R-T Axes :  78  41  37 degrees     QTc Int : 434 ms    Normal sinus rhythm  ST elevation, probably due to early repolarization  Borderline ECG  No previous ECGs available  Confirmed by REID FALL ([id number]) on 4/5/2024 9:00:00 AM    Referred By:            Confirmed By: REID FALL             EKG: Reviewed in ER and     Condition on Discharge:  improved    Vital Signs  Temp:  [97.8 °F (36.6 °C)-98.1 °F (36.7 °C)] 97.8 °F (36.6 °C)  Heart Rate:  [66-76] 66  Resp:  [18] 18  BP: (141-154)/(82-87) 154/87    Discharge Disposition:  [facility]    Discharge Medications:     Discharge Medications        New Medications        Instructions Start Date   ARIPiprazole 15 MG tablet  Commonly known as: ABILIFY   15 mg, Oral, Daily   Start Date: April 12, 2024     hydrOXYzine 50 MG tablet  Commonly known as: ATARAX   50 mg, Oral, Every 6 Hours PRN      pantoprazole 40 MG EC tablet  Commonly known as: PROTONIX   40 mg, Oral, Every Morning Before Breakfast   Start Date: April 12, 2024     sertraline 100 MG tablet  Commonly known as: ZOLOFT   100 mg, Oral, Daily   Start Date: April 12, 2024     traZODone 50 MG tablet  Commonly known as: DESYREL   50 mg, Oral, Nightly PRN               Discharge Diet:  As tolerated    Activity at Discharge:  As tolerated    Follow-up  Appointments  [place].  Location: [address] [place] [address]  Therapy with Chelita Canela 4/18/24 at 1pm. Patient will be seen telehealth at that location due to insurance.  (They requested patient arrive one hour early.)  Follow up for medication will be scheduled at that appointment.      Test Results Pending at Discharge   None    Time: I spent >35 minutes on this discharge activity which included: face-to-face encounter with the patient, reviewing the data in the system, coordination of the care with the nursing staff as well as consultants, documentation, and entering orders.             Chelita Escalante MD  Psychiatrist   [facility]    This is electronically signed by Chelita Escalante MD

## 2024-04-11 NOTE — PLAN OF CARE
"Goal Outcome Evaluation:  Plan of Care Reviewed With: patient  Patient Agreement with Plan of Care: agrees  Consent Given to Review Plan with: Pt reports anxiety of 6/10 and depression of 7/10. Pt denies SI and HI. Pt denies AVH when questioned but reports that he still sees \"colored lights\" intermittently that \"come and go\" and hears voices \"occasionally but not very often\". Upon assessment pt reported that he sometimes feels like he is \"swaying side to side when walking\" but denied dizziness or weakness. Pt recieved PRN tylenol for 8/10 generalized pain, atarax and trazodone. Pt slept well with two awakenings for snacks overnight. Will continue plan of care.                               "

## 2024-04-11 NOTE — PLAN OF CARE
Goal Outcome Evaluation:  Plan of Care Reviewed With: patient  Patient Agreement with Plan of Care: agrees        Outcome Evaluation: Patient denies SI/HI/AVH.Pt provided education prior to d/c to homeless shelter. Belongings returned to patient. His follow up appointment is at [facility] on 4/18/24 at 1:00pm. Patient being disharged with medications and instructions on how to take.

## 2024-04-11 NOTE — DISCHARGE INSTRUCTIONS
Patient should continue medications as prescribed at discharge.  It is recommended that patient follow-up with appointments as scheduled.    If dangerous thoughts toward self or others occur, or if safety is a concern please go to the emergency room or call 911 for help.    It is recommended that patient abstain from the use of drugs and/or alcohol following discharge.    Patient is advised to abstain from the use of tobacco products.  -Risk associated with use of tobacco products have been discussed with patient as well as treatment options to aid in discontinuation of use discussed and are available to patient if they choose.

## 2024-04-11 NOTE — THERAPY DISCHARGE NOTE
"Therapist met 1-1 in the office. Patient calm/cooperative, oriented x 4.  Patient noted to have appropriate affect, congruent mood, normal thought content. Patient denies SI/HI/AVH and acute symptoms.   Safety planning conducted; patient/family denies access to firearms, weapons, medications. Medications were recommended to be safe guarded and for family to administer with patient.     Assisted patient in identifying risk factors which would indicate the need for higher level of care including thoughts to harm self or others and/or self-harming behavior and encouraged patient to call [phone], call 911, or present to the nearest emergency room should any of these events occur. Discussed crisis intervention services and means to access.  Patient adamantly and convincingly denies current suicidal or homicidal ideation or perceptual disturbance.    Therapist completed the following safety plan with the patient       SAFETY/MENTAL HEALTH PLAN    1. Recognizing warning signs: Warning signs that a crisis may be developing such as thoughts, images, mood, situation, behaviors:  Sad, depressed, \"a lot of ding ding-a-ring\", hallucinations (worse).         2. Internal coping strategies: Things that the patient can do to take their mind off problems without contacting another person such as relaxation techniques, physical activity, etc:  Take a walk in nature, fishing.       3. Socialization strategies for distraction and support: People and social settings that provide distraction or support - names or places, telephone:   Zhou Johnson (Father), Alexa Johnson (Mother).       4. Social contact for assistance in resolving crisis: People the patient can ask for help - names and telephone:  Zhou Johnson (Father), Alexa Johnson (Mother).       5. Professionals or agencies contacts to help resolve crisis: Professionals or agencies the patient can contact during a crisis - clinician name/location/phone/emergency contact number, local urgent " care services with address/phone, National Suicide Prevention Lifeline (370), Emergency contact 911:    [facility], 986, 911       6. Means restriction: Secure sharps, medications, safeguard weapons, identified crisis plan, made multiple outpatient provider appointments for follow up care.

## 2024-04-11 NOTE — SIGNIFICANT NOTE
Patient has follow up appointment with [facility].  Location: [address] [place] [address]  Therapy with Chelita Canela 4/18/24 at 1pm. Patient will be seen telehealth at that location due to insurance.  (They requested patient arrive one hour early.)  Follow up for medication will be scheduled at that appointment.